# Patient Record
Sex: MALE | Race: WHITE | NOT HISPANIC OR LATINO | Employment: OTHER | URBAN - METROPOLITAN AREA
[De-identification: names, ages, dates, MRNs, and addresses within clinical notes are randomized per-mention and may not be internally consistent; named-entity substitution may affect disease eponyms.]

---

## 2023-08-14 ENCOUNTER — HOSPITAL ENCOUNTER (OUTPATIENT)
Dept: DATA CONVERSION | Facility: HOSPITAL | Age: 83
Discharge: HOME | End: 2023-08-14

## 2023-08-14 DIAGNOSIS — I10 ESSENTIAL (PRIMARY) HYPERTENSION: ICD-10-CM

## 2023-08-14 LAB
ANION GAP SERPL CALCULATED.3IONS-SCNC: 13 MMOL/L (ref 0–19)
BUN SERPL-MCNC: 29 MG/DL (ref 8–25)
BUN/CREAT SERPL: 17.1 RATIO (ref 8–21)
CALCIUM SERPL-MCNC: 9.8 MG/DL (ref 8.5–10.4)
CHLORIDE SERPL-SCNC: 105 MMOL/L (ref 97–107)
CO2 SERPL-SCNC: 24 MMOL/L (ref 24–31)
CREAT SERPL-MCNC: 1.7 MG/DL (ref 0.4–1.6)
GFR SERPL CREATININE-BSD FRML MDRD: 39 ML/MIN/1.73 M2
GLUCOSE SERPL-MCNC: 94 MG/DL (ref 65–99)
POTASSIUM SERPL-SCNC: 4.5 MMOL/L (ref 3.4–5.1)
SODIUM SERPL-SCNC: 142 MMOL/L (ref 133–145)

## 2023-08-26 PROBLEM — Z91.89 AT RISK FOR BLEEDING: Status: ACTIVE | Noted: 2023-08-26

## 2023-08-26 PROBLEM — R68.89 ACTIVITY INTOLERANCE: Status: ACTIVE | Noted: 2023-08-26

## 2023-08-26 PROBLEM — R53.83 FATIGUE: Status: ACTIVE | Noted: 2023-08-26

## 2023-08-26 PROBLEM — M16.11 ARTHRITIS OF RIGHT HIP: Status: ACTIVE | Noted: 2023-08-26

## 2023-08-26 PROBLEM — Z86.718 HISTORY OF VENOUS THROMBOEMBOLISM: Status: ACTIVE | Noted: 2023-08-26

## 2023-08-26 PROBLEM — N32.81 OVERACTIVE BLADDER: Status: ACTIVE | Noted: 2023-08-26

## 2023-08-26 PROBLEM — N20.0 KIDNEY STONE: Status: ACTIVE | Noted: 2023-08-26

## 2023-08-26 PROBLEM — I20.9 ANGINA PECTORIS (CMS-HCC): Status: ACTIVE | Noted: 2023-08-26

## 2023-08-26 PROBLEM — E78.2 MIXED HYPERLIPIDEMIA: Status: ACTIVE | Noted: 2023-08-26

## 2023-08-26 PROBLEM — I26.99 PULMONARY EMBOLISM (MULTI): Status: ACTIVE | Noted: 2023-08-26

## 2023-08-26 PROBLEM — R06.89 AIRWAY CLEARANCE IMPAIRMENT: Status: ACTIVE | Noted: 2023-08-26

## 2023-08-26 PROBLEM — I10 ESSENTIAL HYPERTENSION: Status: ACTIVE | Noted: 2023-08-26

## 2023-08-26 PROBLEM — H90.3 BILATERAL SENSORINEURAL HEARING LOSS: Status: ACTIVE | Noted: 2023-08-26

## 2023-08-26 PROBLEM — C34.90 LUNG CANCER (MULTI): Status: ACTIVE | Noted: 2023-08-26

## 2023-08-26 PROBLEM — E03.9 HYPOTHYROIDISM: Status: ACTIVE | Noted: 2023-08-26

## 2023-08-26 PROBLEM — R06.89 IMPAIRED GAS EXCHANGE: Status: ACTIVE | Noted: 2023-08-26

## 2023-08-26 PROBLEM — M10.9 GOUT: Status: ACTIVE | Noted: 2023-08-26

## 2023-08-26 PROBLEM — I25.118 ATHEROSCLEROTIC HEART DISEASE OF NATIVE CORONARY ARTERY WITH OTHER FORMS OF ANGINA PECTORIS (CMS-HCC): Status: ACTIVE | Noted: 2023-08-26

## 2023-08-26 PROBLEM — G62.9 NEUROPATHY: Status: ACTIVE | Noted: 2023-08-26

## 2023-08-26 PROBLEM — F32.A DEPRESSION: Status: ACTIVE | Noted: 2023-08-26

## 2023-08-26 PROBLEM — K21.9 GASTROESOPHAGEAL REFLUX DISEASE: Status: ACTIVE | Noted: 2023-08-26

## 2023-08-26 PROBLEM — K64.4 EXTERNAL HEMORRHOIDS: Status: ACTIVE | Noted: 2023-08-26

## 2023-08-26 RX ORDER — ALLOPURINOL 300 MG/1
300 TABLET ORAL DAILY
COMMUNITY
End: 2023-10-23 | Stop reason: ALTCHOICE

## 2023-08-26 RX ORDER — WARFARIN SODIUM 5 MG/1
5 TABLET ORAL
COMMUNITY
End: 2023-10-23 | Stop reason: ALTCHOICE

## 2023-08-26 RX ORDER — ALLOPURINOL 100 MG/1
100 TABLET ORAL DAILY
COMMUNITY
End: 2023-10-23 | Stop reason: ALTCHOICE

## 2023-08-26 RX ORDER — OXYBUTYNIN CHLORIDE 5 MG/1
5 TABLET ORAL 2 TIMES DAILY
COMMUNITY
Start: 2021-11-03 | End: 2023-10-16 | Stop reason: SDUPTHER

## 2023-08-26 RX ORDER — HYDROCODONE BITARTRATE AND ACETAMINOPHEN 5; 325 MG/1; MG/1
1 TABLET ORAL EVERY 4 HOURS PRN
COMMUNITY
End: 2023-10-23 | Stop reason: ALTCHOICE

## 2023-08-26 RX ORDER — ASPIRIN 325 MG
325 TABLET ORAL DAILY
COMMUNITY
End: 2023-10-27 | Stop reason: ALTCHOICE

## 2023-08-26 RX ORDER — TORSEMIDE 20 MG/1
20 TABLET ORAL DAILY
COMMUNITY
Start: 2023-07-31 | End: 2023-10-23 | Stop reason: ALTCHOICE

## 2023-08-26 RX ORDER — LOSARTAN POTASSIUM 50 MG/1
50 TABLET ORAL DAILY
COMMUNITY
Start: 2021-11-19 | End: 2023-10-23 | Stop reason: ALTCHOICE

## 2023-08-26 RX ORDER — MULTIVITAMIN
1 TABLET ORAL DAILY
COMMUNITY

## 2023-08-26 RX ORDER — ASPIRIN 81 MG/1
81 TABLET ORAL DAILY
COMMUNITY

## 2023-08-26 RX ORDER — TAMSULOSIN HYDROCHLORIDE 0.4 MG/1
0.4 CAPSULE ORAL DAILY
COMMUNITY
End: 2023-10-23 | Stop reason: ALTCHOICE

## 2023-08-26 RX ORDER — SOLIFENACIN SUCCINATE 5 MG/1
5 TABLET, FILM COATED ORAL DAILY
COMMUNITY
End: 2023-10-23 | Stop reason: ALTCHOICE

## 2023-08-26 RX ORDER — ROSUVASTATIN CALCIUM 20 MG/1
20 TABLET, COATED ORAL DAILY
COMMUNITY
Start: 2020-11-06 | End: 2023-11-01 | Stop reason: SDUPTHER

## 2023-08-26 RX ORDER — LEVOTHYROXINE SODIUM 50 UG/1
50 TABLET ORAL DAILY
COMMUNITY
Start: 2014-11-13

## 2023-08-26 RX ORDER — ATORVASTATIN CALCIUM 40 MG/1
40 TABLET, FILM COATED ORAL DAILY
COMMUNITY
End: 2023-10-23 | Stop reason: ALTCHOICE

## 2023-08-26 RX ORDER — OMEPRAZOLE 40 MG/1
40 CAPSULE, DELAYED RELEASE ORAL DAILY
COMMUNITY

## 2023-08-26 RX ORDER — ENOXAPARIN SODIUM 100 MG/ML
INJECTION SUBCUTANEOUS
COMMUNITY
End: 2023-10-23 | Stop reason: ALTCHOICE

## 2023-08-26 RX ORDER — ATENOLOL 25 MG/1
25 TABLET ORAL DAILY
COMMUNITY
End: 2023-10-23 | Stop reason: ALTCHOICE

## 2023-08-26 RX ORDER — VALSARTAN 80 MG/1
80 TABLET ORAL DAILY
COMMUNITY
Start: 2023-07-31

## 2023-08-26 RX ORDER — VENLAFAXINE HYDROCHLORIDE 37.5 MG/1
37.5 CAPSULE, EXTENDED RELEASE ORAL DAILY
COMMUNITY
Start: 2018-12-21

## 2023-08-26 RX ORDER — DOCUSATE SODIUM 100 MG/1
100 CAPSULE, LIQUID FILLED ORAL 2 TIMES DAILY
COMMUNITY
End: 2023-10-23 | Stop reason: ALTCHOICE

## 2023-08-26 RX ORDER — VENLAFAXINE HYDROCHLORIDE 75 MG/1
75 CAPSULE, EXTENDED RELEASE ORAL DAILY
COMMUNITY
End: 2023-10-27 | Stop reason: ALTCHOICE

## 2023-08-26 RX ORDER — ACETAMINOPHEN 325 MG/1
650 TABLET ORAL EVERY 6 HOURS PRN
COMMUNITY
End: 2023-10-27 | Stop reason: ALTCHOICE

## 2023-09-26 ENCOUNTER — HOSPITAL ENCOUNTER (OUTPATIENT)
Dept: DATA CONVERSION | Facility: HOSPITAL | Age: 83
Discharge: HOME | End: 2023-09-26
Payer: MEDICARE

## 2023-09-26 DIAGNOSIS — I50.9 HEART FAILURE, UNSPECIFIED (MULTI): ICD-10-CM

## 2023-10-13 ENCOUNTER — TELEPHONE (OUTPATIENT)
Dept: PRIMARY CARE | Facility: CLINIC | Age: 83
End: 2023-10-13
Payer: MEDICARE

## 2023-10-13 DIAGNOSIS — R68.89 ACTIVITY INTOLERANCE: Primary | ICD-10-CM

## 2023-10-16 DIAGNOSIS — N32.81 OVERACTIVE BLADDER: Primary | ICD-10-CM

## 2023-10-16 RX ORDER — OXYBUTYNIN CHLORIDE 5 MG/1
5 TABLET ORAL 2 TIMES DAILY
Qty: 180 TABLET | Refills: 1 | Status: SHIPPED | OUTPATIENT
Start: 2023-10-16 | End: 2023-10-23 | Stop reason: ALTCHOICE

## 2023-10-23 ENCOUNTER — OFFICE VISIT (OUTPATIENT)
Dept: OTOLARYNGOLOGY | Facility: CLINIC | Age: 83
End: 2023-10-23
Payer: MEDICARE

## 2023-10-23 VITALS — WEIGHT: 216 LBS | TEMPERATURE: 97.5 F | HEIGHT: 71 IN | BODY MASS INDEX: 30.24 KG/M2

## 2023-10-23 DIAGNOSIS — H61.20 CERUMEN IN AUDITORY CANAL ON EXAMINATION: Primary | ICD-10-CM

## 2023-10-23 DIAGNOSIS — K21.9 LARYNGOPHARYNGEAL REFLUX: ICD-10-CM

## 2023-10-23 PROCEDURE — 1036F TOBACCO NON-USER: CPT | Performed by: OTOLARYNGOLOGY

## 2023-10-23 PROCEDURE — 1159F MED LIST DOCD IN RCRD: CPT | Performed by: OTOLARYNGOLOGY

## 2023-10-23 PROCEDURE — 1160F RVW MEDS BY RX/DR IN RCRD: CPT | Performed by: OTOLARYNGOLOGY

## 2023-10-23 PROCEDURE — 99213 OFFICE O/P EST LOW 20 MIN: CPT | Performed by: OTOLARYNGOLOGY

## 2023-10-24 NOTE — PROGRESS NOTES
"HPI  Joseph Hicks is a 82 y.o. male here for routine right-sided cerumen management.  Wears hearing aids bilaterally and does well with them.  History of reflux and takes an acid with good effect.  Call us for refills when necessary.  He does not need refill now.  History of sensorineural hearing loss from treatment of lung cancer and chemotherapy around 11 years ago.      Past Medical History:   Diagnosis Date    Personal history of malignant neoplasm, unspecified     History of cancer            Medications:     Current Outpatient Medications:     acetaminophen (Tylenol) 325 mg tablet, Take 2 tablets (650 mg) by mouth every 6 hours if needed (pain)., Disp: , Rfl:     aspirin 325 mg tablet, Take 1 tablet (325 mg) by mouth once daily. Additional Instructions: ANTIPLATELET, Disp: , Rfl:     aspirin 81 mg EC tablet, Take 1 tablet (81 mg) by mouth once daily., Disp: , Rfl:     levothyroxine (Synthroid, Levoxyl) 50 mcg tablet, Take 1 tablet (50 mcg) by mouth once daily., Disp: , Rfl:     omeprazole (PriLOSEC) 40 mg DR capsule, Take 1 capsule (40 mg) by mouth once daily.  Additional Instructions: REFLUX, Disp: , Rfl:     rosuvastatin (Crestor) 20 mg tablet, Take 1 tablet (20 mg) by mouth once daily., Disp: , Rfl:     valsartan (Diovan) 80 mg tablet, Take 1 tablet (80 mg) by mouth once daily., Disp: , Rfl:     venlafaxine XR (Effexor-XR) 37.5 mg 24 hr capsule, Take 1 capsule (37.5 mg) by mouth once daily. With food, Disp: , Rfl:     venlafaxine XR (Effexor-XR) 75 mg 24 hr capsule, Take 1 capsule (75 mg) by mouth once daily. Additional Instructions: MOOD, Disp: , Rfl:     multivitamin with minerals (multivitamin with folic acid) tablet, Take 1 tablet by mouth once daily.  Additional Instructions: SUPPLEMENT, Disp: , Rfl:      Allergies:  No Known Allergies     Physical Exam:  Last Recorded Vitals  Temperature 36.4 °C (97.5 °F), height 1.803 m (5' 11\"), weight 98 kg (216 lb).  General:     General appearance: " Well-developed, well-nourished in no acute distress.       Voice:  normal       Head/face: Normal appearance; nontender to palpation     Facial nerve function: Normal and symmetric bilaterally.    Oral/oropharynx:     Oral vestibule: Normal labial and gingival mucosa     Tongue/floor of mouth: Normal without lesion     Oropharynx: Clear.  No lesions present of the hard/soft palate, posterior pharynx    Neck:     Neck: Normal appearance, trachea midline     Salivary glands: Normal to palpation bilaterally     Lymph nodes: No cervical lymphadenopathy to palpation     Thyroid: No thyromegaly.  No palpable nodules     Range of motion: Normal    Neurological:     Cortical functions: Alert and oriented x3, appropriate affect       Larynx/hypopharynx:     Laryngeal findings: Mirror exam inadequate or limited secondary to enlarged base of tongue and/or excessive gagging    Ear:     Ear canal: Normal bilaterally after cleaning dense cerumen impaction on the right and moderate cerumen on the left     Tympanic membrane: Intact and mobile bilaterally     Pinna: Normal bilaterally     Hearing:  Gross hearing assessment normal by voice    Nose:     Visualized using: Anterior rhinoscopy     Nasopharynx: Inadequate mirror exam secondary to gag, anatomy.       Nasal dorsum: Nontraumatic midline appearance     Septum: Midline     Inferior turbinates: Normally sized     Mucosa: Bilateral, pink, normal appearing       Assessment/Plan   Continue antacid.  Right ear cleaned.  We will see him back in 6 months and sooner as needed         Alexandre Joyner MD

## 2023-10-26 PROBLEM — R06.89 AIRWAY CLEARANCE IMPAIRMENT: Status: RESOLVED | Noted: 2023-08-26 | Resolved: 2023-10-26

## 2023-10-26 PROBLEM — Z85.118 HISTORY OF LUNG CANCER: Status: ACTIVE | Noted: 2023-10-26

## 2023-10-26 PROBLEM — R53.83 FATIGUE: Status: RESOLVED | Noted: 2023-08-26 | Resolved: 2023-10-26

## 2023-10-26 PROBLEM — C34.90 LUNG CANCER (MULTI): Status: RESOLVED | Noted: 2023-08-26 | Resolved: 2023-10-26

## 2023-10-26 PROBLEM — R06.89 IMPAIRED GAS EXCHANGE: Status: RESOLVED | Noted: 2023-08-26 | Resolved: 2023-10-26

## 2023-10-26 PROBLEM — N20.0 KIDNEY STONE: Status: RESOLVED | Noted: 2023-08-26 | Resolved: 2023-10-26

## 2023-10-26 PROBLEM — K64.4 EXTERNAL HEMORRHOIDS: Status: RESOLVED | Noted: 2023-08-26 | Resolved: 2023-10-26

## 2023-10-26 PROBLEM — I26.99 PULMONARY EMBOLISM (MULTI): Status: RESOLVED | Noted: 2023-08-26 | Resolved: 2023-10-26

## 2023-10-26 NOTE — PROGRESS NOTES
[facility]: [facility]  MEDICARE WELLNESS EXAM      Joseph Hicks is a 82 y.o. male that is presenting today for Annual Exam.    Assessment/Plan    Diagnoses and all orders for this visit:  Annual physical exam  Overactive bladder  Neuropathy  Kidney stone  Acquired hypothyroidism  -     TSH with reflex to Free T4 if abnormal; Future  Malignant neoplasm of lung, unspecified laterality, unspecified part of lung (CMS/HCC)  Mixed hyperlipidemia  -     Lipid Panel; Future  -     Follow Up In Primary Care - Established; Future  History of venous thromboembolism  Gastroesophageal reflux disease without esophagitis  Chronic gout without tophus, unspecified cause, unspecified site  -     CBC and Auto Differential; Future  -     Uric acid; Future  Essential hypertension  -     Comprehensive Metabolic Panel; Future  Depression, unspecified depression type  Atherosclerotic heart disease of native coronary artery with other forms of angina pectoris (CMS/[facility])  Bilateral sensorineural hearing loss  Arthritis of right hip  Chronic screening measures.  His immunizations seem to be up-to-date other than his need to get his RSV and COVID vaccines which she will do at a local pharmacy.  We reviewed his recent blood work which looks as well.  There is really no reason to change any of his medicines today and we will plan on seeing him back in about 6 months.  ADVANCED CARE PLANNING  Advanced Care Planning was discussed with patient:  The patient has an active advanced care plan on file. The patient has an active surrogate decision-maker on file.  The patient was encouraged to ensure that any/all documentation is accurate and up to date, and that our office be provided a copy in the event that anything changes.    ACTIVITIES OF DAILY LIVING  Basic ADLs:  Bathing: Independent, Dressing: Independent, Toileting: Independent, Transferring: Independent, Continence: Independent, Feeding: Independent.     Instrumental ADLs:  Ability to use phone: Independent, Shopping: Independent, Cooking: Independent, House-keeping: Independent, Laundry: Independent, Transportation: Independent, Medication Management: Independent, Finance Management: Independent.    Subjective   He is here for his Medicare wellness exam.  Overall he has been feeling very good.  The most recent problem was when he had that leg swelling and was on a higher dose of torsemide.  Fortunately the echocardiogram was normal and the cardiologist Dr. Severino has been able to gradually reduce the torsemide.  The patient is very pleased in that regard and brings forth no new problems today.    Review of Systems   Respiratory: Negative.     Cardiovascular: Negative.    Gastrointestinal: Negative.    Endocrine: Negative.    Genitourinary: Negative.    Musculoskeletal: Negative.    Neurological: Negative.      Objective   Vitals:    10/27/23 1036   BP: 108/60   Pulse: 62   Temp: 36.5 °C (97.7 °F)   SpO2: 95%      Body mass index is 30.13 kg/m².  Physical Exam  HENT:      Head: Normocephalic.   Eyes:      Pupils: Pupils are equal, round, and reactive to light.   Cardiovascular:      Rate and Rhythm: Normal rate and regular rhythm.   Pulmonary:      Effort: Pulmonary effort is normal.      Breath sounds: Normal breath sounds.   Abdominal:      General: Abdomen is flat. Bowel sounds are normal.      Palpations: Abdomen is soft.   Musculoskeletal:         General: Normal range of motion.      Left lower leg: No edema.   Skin:     General: Skin is warm and dry.   Neurological:      Mental Status: He is alert and oriented to person, place, and time. Mental status is at baseline.     Diagnostic Results   Lab Results   Component Value Date    GLUCOSE 94 08/14/2023    CALCIUM 9.8 08/14/2023     08/14/2023    K 4.5 08/14/2023    CO2 24 08/14/2023     08/14/2023    BUN 29 (H) 08/14/2023    CREATININE 1.7 (H) 08/14/2023     Lab Results   Component Value Date     "ALT 22 06/20/2023    AST 29 06/20/2023    ALKPHOS 113 06/20/2023    BILITOT 0.8 06/20/2023     Lab Results   Component Value Date    WBC 4.6 06/20/2023    HGB 15.8 06/20/2023    HCT 48.6 06/20/2023    .0 (H) 06/20/2023     06/20/2023     Lab Results   Component Value Date    CHOL 153 03/16/2023    CHOL 149 05/27/2022    CHOL 177 11/19/2021     Lab Results   Component Value Date    HDL 68 03/16/2023    HDL 68 05/27/2022    HDL 64 11/19/2021     Lab Results   Component Value Date    LDLCALC 65 03/16/2023    LDLCALC 59 (L) 05/27/2022    LDLCALC 80 11/19/2021     Lab Results   Component Value Date    TRIG 102 03/16/2023    TRIG 112 05/27/2022    TRIG 163 (H) 11/19/2021     No components found for: \"CHOLHDL\"  Lab Results   Component Value Date    HGBA1C 5.7 05/27/2022     Other labs not included in the list above reviewed either before or during this encounter.    History   Past Medical History:   Diagnosis Date   • Airway clearance impairment 08/26/2023   • Angina pectoris (CMS/HCC) 08/26/2023   • Arthritis of right hip 08/26/2023   • Atherosclerotic heart disease of native coronary artery with other forms of angina pectoris (CMS/HCC) 08/26/2023   • Bilateral sensorineural hearing loss 08/26/2023   • Depression 08/26/2023   • Essential hypertension 08/26/2023   • External hemorrhoids 08/26/2023   • Gastroesophageal reflux disease 08/26/2023   • Gout 08/26/2023   • History of venous thromboembolism 08/26/2023   • Hypothyroidism 08/26/2023   • Impaired gas exchange 08/26/2023   • Kidney stone 08/26/2023   • Lung cancer (CMS/HCC) 2012    nonsmall cell   • Mixed hyperlipidemia 08/26/2023   • Neuropathy 08/26/2023   • Overactive bladder 08/26/2023   • Personal history of malignant neoplasm, unspecified     History of cancer   • Pulmonary embolism (CMS/HCC) 2012     Past Surgical History:   Procedure Laterality Date   • CARDIAC CATHETERIZATION  2013   • COLONOSCOPY  2013   • CT GUIDED IMAGING FOR NEEDLE " PLACEMENT  09/26/2012    CT GUIDED IMAGING FOR NEEDLE PLACEMENT [facility]   • OTHER SURGICAL HISTORY  08/24/2022    Hip replacement   • TOTAL HIP ARTHROPLASTY Left      Family History   Problem Relation Name Age of Onset   • Hypothyroidism Daughter       Social History     Socioeconomic History   • Marital status:      Spouse name: Not on file   • Number of children: Not on file   • Years of education: Not on file   • Highest education level: Not on file   Occupational History   • Not on file   Tobacco Use   • Smoking status: Former     Types: Cigarettes   • Smokeless tobacco: Never   Substance and Sexual Activity   • Alcohol use: Not on file   • Drug use: Not on file   • Sexual activity: Not on file   Other Topics Concern   • Not on file   Social History Narrative   • Not on file     Social Determinants of Health     Financial Resource Strain: Not on file   Food Insecurity: Not on file   Transportation Needs: Not on file   Physical Activity: Not on file   Stress: Not on file   Social Connections: Not on file   Intimate Partner Violence: Not on file   Housing Stability: Not on file     No Known Allergies  Current Outpatient Medications on File Prior to Visit   Medication Sig Dispense Refill   • aspirin 81 mg EC tablet Take 1 tablet (81 mg) by mouth once daily.     • levothyroxine (Synthroid, Levoxyl) 50 mcg tablet Take 1 tablet (50 mcg) by mouth once daily.     • multivitamin with minerals (multivitamin with folic acid) tablet Take 1 tablet by mouth once daily.  Additional Instructions: SUPPLEMENT     • omeprazole (PriLOSEC) 40 mg DR capsule Take 1 capsule (40 mg) by mouth once daily.  Additional Instructions: REFLUX     • oxybutynin XL (Ditropan-XL) 5 mg 24 hr tablet Take 1 tablet (5 mg) by mouth once daily. Do not crush, chew, or split.     • rosuvastatin (Crestor) 20 mg tablet Take 1 tablet (20 mg) by mouth once daily.     • torsemide (Demadex) 5 mg tablet Take 1 tablet (5 mg) by mouth once  daily.     • valsartan (Diovan) 80 mg tablet Take 1 tablet (80 mg) by mouth once daily.     • venlafaxine XR (Effexor-XR) 37.5 mg 24 hr capsule Take 1 capsule (37.5 mg) by mouth once daily. With food     • [DISCONTINUED] acetaminophen (Tylenol) 325 mg tablet Take 2 tablets (650 mg) by mouth every 6 hours if needed (pain).     • [DISCONTINUED] allopurinol (Zyloprim) 100 mg tablet Take 1 tablet (100 mg) by mouth once daily.     • [DISCONTINUED] allopurinol (Zyloprim) 300 mg tablet Take 1 tablet (300 mg) by mouth once daily. Additional Instructions: KIDNEY STONES     • [DISCONTINUED] aspirin 325 mg tablet Take 1 tablet (325 mg) by mouth once daily. Additional Instructions: ANTIPLATELET     • [DISCONTINUED] atenolol (Tenormin) 25 mg tablet Take 1 tablet (25 mg) by mouth once daily.     • [DISCONTINUED] atorvastatin (Lipitor) 40 mg tablet Take 1 tablet (40 mg) by mouth once daily.     • [DISCONTINUED] docusate sodium (Colace) 100 mg capsule Take 1 capsule (100 mg) by mouth 2 times a day.     • [DISCONTINUED] DULCOLAX, BISACODYL, RECT once daily as needed (constipation).     • [DISCONTINUED] enoxaparin (Lovenox) 30 mg/0.3 mL syringe DIRECTIONS: 30 MG SUBCUTANEOUS TWICE A DAY.  Additional Instructions: 12 doses.     • [DISCONTINUED] HYDROcodone-acetaminophen (Norco) 5-325 mg tablet Take 1 tablet by mouth every 4 hours if needed (Pain).     • [DISCONTINUED] losartan (Cozaar) 50 mg tablet Take 1 tablet (50 mg) by mouth once daily.     • [DISCONTINUED] oxybutynin (Ditropan) 5 mg tablet Take 1 tablet (5 mg) by mouth 2 times a day. 180 tablet 1   • [DISCONTINUED] polyethylene glycol 3350 (MIRALAX ORAL) ONE CAPFUL DISSOLVED IN A GLASS OF WATER ONCE DAILY     • [DISCONTINUED] solifenacin (Vesicare) 5 mg tablet Take 1 tablet (5 mg) by mouth once daily. Additional Instructions: BLADDER     • [DISCONTINUED] tamsulosin (Flomax) 0.4 mg 24 hr capsule Take 1 capsule (0.4 mg) by mouth once daily.     • [DISCONTINUED] torsemide (Demadex)  20 mg tablet Take 1 tablet (20 mg) by mouth once daily.     • [DISCONTINUED] venlafaxine XR (Effexor-XR) 75 mg 24 hr capsule Take 1 capsule (75 mg) by mouth once daily. Additional Instructions: MOOD     • [DISCONTINUED] warfarin (Coumadin) 5 mg tablet Take 1 tablet (5 mg) by mouth. Daily       No current facility-administered medications on file prior to visit.     Immunization History   Administered Date(s) Administered   • Flu vaccine, quadrivalent, high-dose, preservative free, age 65y+ (FLUZONE) 09/13/2020, 10/01/2020, 09/25/2021, 10/10/2022   • Influenza, High Dose Seasonal, Preservative Free 10/02/2017, 10/02/2018, 10/04/2019   • Influenza, injectable, quadrivalent 09/23/2016   • Influenza, seasonal, injectable 11/02/2013, 10/01/2014, 10/06/2015   • Moderna COVID-19 vaccine, bivalent, blue cap/gray label *Check age/dose* 12/06/2022, 06/16/2023   • Moderna SARS-CoV-2 Vaccination 01/15/2021, 02/01/2021, 03/02/2021, 11/04/2021, 08/16/2022   • Pneumococcal conjugate vaccine, 13-valent (PREVNAR 13) 05/08/2015   • Pneumococcal polysaccharide vaccine, 23-valent, age 2 years and older (PNEUMOVAX 23) 05/02/2014, 11/06/2020   • Tdap vaccine, age 7 year and older (BOOSTRIX) 10/04/2017   • Zoster vaccine, recombinant, adult (SHINGRIX) 01/05/2018, 04/08/2018, 07/20/2018, 05/01/2019   • Zoster, live 01/02/2012     Patient's medical history was reviewed and updated either before or during this encounter.    Bogdan Busby MD

## 2023-10-27 ENCOUNTER — LAB (OUTPATIENT)
Dept: LAB | Facility: LAB | Age: 83
End: 2023-10-27
Payer: MEDICARE

## 2023-10-27 ENCOUNTER — OFFICE VISIT (OUTPATIENT)
Dept: PRIMARY CARE | Facility: CLINIC | Age: 83
End: 2023-10-27
Payer: MEDICARE

## 2023-10-27 VITALS
WEIGHT: 216 LBS | HEART RATE: 62 BPM | OXYGEN SATURATION: 95 % | DIASTOLIC BLOOD PRESSURE: 60 MMHG | BODY MASS INDEX: 30.13 KG/M2 | TEMPERATURE: 97.7 F | SYSTOLIC BLOOD PRESSURE: 108 MMHG

## 2023-10-27 DIAGNOSIS — M1A.9XX0 CHRONIC GOUT WITHOUT TOPHUS, UNSPECIFIED CAUSE, UNSPECIFIED SITE: ICD-10-CM

## 2023-10-27 DIAGNOSIS — C34.90 MALIGNANT NEOPLASM OF LUNG, UNSPECIFIED LATERALITY, UNSPECIFIED PART OF LUNG (MULTI): ICD-10-CM

## 2023-10-27 DIAGNOSIS — E03.9 ACQUIRED HYPOTHYROIDISM: ICD-10-CM

## 2023-10-27 DIAGNOSIS — M16.11 ARTHRITIS OF RIGHT HIP: ICD-10-CM

## 2023-10-27 DIAGNOSIS — G62.9 NEUROPATHY: ICD-10-CM

## 2023-10-27 DIAGNOSIS — Z86.718 HISTORY OF VENOUS THROMBOEMBOLISM: ICD-10-CM

## 2023-10-27 DIAGNOSIS — F32.A DEPRESSION, UNSPECIFIED DEPRESSION TYPE: ICD-10-CM

## 2023-10-27 DIAGNOSIS — I25.118 ATHEROSCLEROTIC HEART DISEASE OF NATIVE CORONARY ARTERY WITH OTHER FORMS OF ANGINA PECTORIS (CMS-HCC): ICD-10-CM

## 2023-10-27 DIAGNOSIS — I10 ESSENTIAL HYPERTENSION: ICD-10-CM

## 2023-10-27 DIAGNOSIS — Z00.00 ANNUAL PHYSICAL EXAM: Primary | ICD-10-CM

## 2023-10-27 DIAGNOSIS — N32.81 OVERACTIVE BLADDER: ICD-10-CM

## 2023-10-27 DIAGNOSIS — E78.2 MIXED HYPERLIPIDEMIA: ICD-10-CM

## 2023-10-27 DIAGNOSIS — N20.0 KIDNEY STONE: ICD-10-CM

## 2023-10-27 DIAGNOSIS — H90.3 BILATERAL SENSORINEURAL HEARING LOSS: ICD-10-CM

## 2023-10-27 DIAGNOSIS — E78.5 HYPERLIPIDEMIA, UNSPECIFIED: ICD-10-CM

## 2023-10-27 DIAGNOSIS — E03.9 HYPOTHYROIDISM, UNSPECIFIED: ICD-10-CM

## 2023-10-27 DIAGNOSIS — M10.9 GOUT, UNSPECIFIED: Primary | ICD-10-CM

## 2023-10-27 DIAGNOSIS — K21.9 GASTROESOPHAGEAL REFLUX DISEASE WITHOUT ESOPHAGITIS: ICD-10-CM

## 2023-10-27 LAB
ALBUMIN SERPL-MCNC: 4.3 G/DL (ref 3.5–5)
ALP BLD-CCNC: 92 U/L (ref 35–125)
ALT SERPL-CCNC: 20 U/L (ref 5–40)
ANION GAP SERPL CALC-SCNC: 11 MMOL/L
AST SERPL-CCNC: 24 U/L (ref 5–40)
BILIRUB SERPL-MCNC: 0.9 MG/DL (ref 0.1–1.2)
BUN SERPL-MCNC: 25 MG/DL (ref 8–25)
CALCIUM SERPL-MCNC: 9.7 MG/DL (ref 8.5–10.4)
CHLORIDE SERPL-SCNC: 105 MMOL/L (ref 97–107)
CHOLEST SERPL-MCNC: 152 MG/DL (ref 133–200)
CHOLEST/HDLC SERPL: 2.2 {RATIO}
CO2 SERPL-SCNC: 26 MMOL/L (ref 24–31)
CREAT SERPL-MCNC: 1.3 MG/DL (ref 0.4–1.6)
ERYTHROCYTE [DISTWIDTH] IN BLOOD BY AUTOMATED COUNT: 14.4 % (ref 11.5–14.5)
GFR SERPL CREATININE-BSD FRML MDRD: 54 ML/MIN/1.73M*2
GLUCOSE SERPL-MCNC: 94 MG/DL (ref 65–99)
HCT VFR BLD AUTO: 49.2 % (ref 41–52)
HDLC SERPL-MCNC: 70 MG/DL
HGB BLD-MCNC: 16 G/DL (ref 13.5–17.5)
LDLC SERPL CALC-MCNC: 62 MG/DL (ref 65–130)
MCH RBC QN AUTO: 32.6 PG (ref 26–34)
MCHC RBC AUTO-ENTMCNC: 32.5 G/DL (ref 32–36)
MCV RBC AUTO: 100 FL (ref 80–100)
NRBC BLD-RTO: 0 /100 WBCS (ref 0–0)
PLATELET # BLD AUTO: 202 X10*3/UL (ref 150–450)
PMV BLD AUTO: 9.8 FL (ref 7.5–11.5)
POTASSIUM SERPL-SCNC: 4.5 MMOL/L (ref 3.4–5.1)
PROT SERPL-MCNC: 6.9 G/DL (ref 5.9–7.9)
RBC # BLD AUTO: 4.91 X10*6/UL (ref 4.5–5.9)
SODIUM SERPL-SCNC: 142 MMOL/L (ref 133–145)
TRIGL SERPL-MCNC: 99 MG/DL (ref 40–150)
TSH SERPL DL<=0.05 MIU/L-ACNC: 2 MIU/L (ref 0.27–4.2)
URATE SERPL-MCNC: 7.1 MG/DL (ref 3.6–7.7)
WBC # BLD AUTO: 5.4 X10*3/UL (ref 4.4–11.3)

## 2023-10-27 PROCEDURE — 99214 OFFICE O/P EST MOD 30 MIN: CPT | Performed by: INTERNAL MEDICINE

## 2023-10-27 PROCEDURE — 36415 COLL VENOUS BLD VENIPUNCTURE: CPT

## 2023-10-27 PROCEDURE — 3074F SYST BP LT 130 MM HG: CPT | Performed by: INTERNAL MEDICINE

## 2023-10-27 PROCEDURE — 1159F MED LIST DOCD IN RCRD: CPT | Performed by: INTERNAL MEDICINE

## 2023-10-27 PROCEDURE — 84550 ASSAY OF BLOOD/URIC ACID: CPT

## 2023-10-27 PROCEDURE — 80061 LIPID PANEL: CPT

## 2023-10-27 PROCEDURE — 80053 COMPREHEN METABOLIC PANEL: CPT

## 2023-10-27 PROCEDURE — 85027 COMPLETE CBC AUTOMATED: CPT

## 2023-10-27 PROCEDURE — 1160F RVW MEDS BY RX/DR IN RCRD: CPT | Performed by: INTERNAL MEDICINE

## 2023-10-27 PROCEDURE — 1126F AMNT PAIN NOTED NONE PRSNT: CPT | Performed by: INTERNAL MEDICINE

## 2023-10-27 PROCEDURE — 1036F TOBACCO NON-USER: CPT | Performed by: INTERNAL MEDICINE

## 2023-10-27 PROCEDURE — 84443 ASSAY THYROID STIM HORMONE: CPT

## 2023-10-27 PROCEDURE — 3078F DIAST BP <80 MM HG: CPT | Performed by: INTERNAL MEDICINE

## 2023-10-27 RX ORDER — OXYBUTYNIN CHLORIDE 5 MG/1
5 TABLET, EXTENDED RELEASE ORAL DAILY
COMMUNITY
End: 2024-05-06 | Stop reason: WASHOUT

## 2023-10-27 RX ORDER — TORSEMIDE 5 MG/1
5 TABLET ORAL DAILY
COMMUNITY
End: 2024-05-24 | Stop reason: SINTOL

## 2023-10-27 ASSESSMENT — ENCOUNTER SYMPTOMS
CARDIOVASCULAR NEGATIVE: 1
ENDOCRINE NEGATIVE: 1
DEPRESSION: 0
RESPIRATORY NEGATIVE: 1
OCCASIONAL FEELINGS OF UNSTEADINESS: 0
MUSCULOSKELETAL NEGATIVE: 1
NEUROLOGICAL NEGATIVE: 1
LOSS OF SENSATION IN FEET: 0
GASTROINTESTINAL NEGATIVE: 1

## 2023-10-27 ASSESSMENT — PATIENT HEALTH QUESTIONNAIRE - PHQ9
SUM OF ALL RESPONSES TO PHQ9 QUESTIONS 1 AND 2: 0
2. FEELING DOWN, DEPRESSED OR HOPELESS: NOT AT ALL
1. LITTLE INTEREST OR PLEASURE IN DOING THINGS: NOT AT ALL

## 2023-10-27 ASSESSMENT — PAIN SCALES - GENERAL: PAINLEVEL: 0-NO PAIN

## 2023-10-27 NOTE — PATIENT INSTRUCTIONS
I am glad we had a chance to do your Medicare wellness exam today.  As you know we reviewed your screening measures and you are pretty much up-to-date.  You will be due for pneumonia shot until 2025.  You already had your shingles and tetanus shots and those are up-to-date but you could benefit from an RSV and COVID shot which you can do at a local pharmacy.  I would continue all your current medications as is and I ordered a blood test to do in 6 months.  Please complete the blood test you are supposed to do yesterday today and I will get back to you about that.  Please let me know if I can help you any time during the winter.

## 2023-11-01 ENCOUNTER — TELEPHONE (OUTPATIENT)
Dept: PRIMARY CARE | Facility: CLINIC | Age: 83
End: 2023-11-01
Payer: MEDICARE

## 2023-11-01 DIAGNOSIS — E78.2 MIXED HYPERLIPIDEMIA: ICD-10-CM

## 2023-11-01 RX ORDER — ROSUVASTATIN CALCIUM 20 MG/1
20 TABLET, COATED ORAL DAILY
Qty: 90 TABLET | Refills: 3 | Status: SHIPPED | OUTPATIENT
Start: 2023-11-01 | End: 2024-05-09 | Stop reason: ALTCHOICE

## 2023-11-01 NOTE — TELEPHONE ENCOUNTER
Refill request to Di:     Rosuvastatin Calcium 20 MG Tablet    Disp: 90  Tablet, Duration:  90 day(s)    Si tablet Orally Once a day 90 days  Refills: 3

## 2023-11-19 PROBLEM — F32.A CHRONIC DEPRESSION: Status: ACTIVE | Noted: 2019-03-18

## 2023-11-19 PROBLEM — H33.20 RETINAL DETACHMENT: Status: ACTIVE | Noted: 2019-03-18

## 2023-11-19 PROBLEM — I25.10 CORONARY ARTERIOSCLEROSIS: Status: ACTIVE | Noted: 2019-03-18

## 2023-11-19 PROBLEM — C34.90 MALIGNANT NEOPLASM OF LUNG (MULTI): Status: ACTIVE | Noted: 2019-03-18

## 2023-11-19 PROBLEM — I50.9 CONGESTIVE HEART FAILURE (MULTI): Status: ACTIVE | Noted: 2023-11-19

## 2023-11-19 PROBLEM — H91.90 HEARING LOSS: Status: ACTIVE | Noted: 2019-03-18

## 2023-11-19 PROBLEM — N18.31 STAGE 3A CHRONIC KIDNEY DISEASE (CKD) (MULTI): Status: ACTIVE | Noted: 2023-11-19

## 2023-11-19 PROBLEM — N40.0 BPH WITHOUT URINARY OBSTRUCTION: Status: ACTIVE | Noted: 2019-03-18

## 2023-11-19 PROBLEM — I25.118 ATHEROSCLEROTIC HEART DISEASE OF NATIVE CORONARY ARTERY WITH OTHER FORMS OF ANGINA PECTORIS (CMS-HCC): Status: ACTIVE | Noted: 2019-03-18

## 2023-11-20 ENCOUNTER — TELEPHONE (OUTPATIENT)
Dept: PRIMARY CARE | Facility: CLINIC | Age: 83
End: 2023-11-20
Payer: MEDICARE

## 2023-11-20 DIAGNOSIS — N20.0 KIDNEY STONE: Primary | ICD-10-CM

## 2023-11-20 RX ORDER — ALLOPURINOL 100 MG/1
100 TABLET ORAL DAILY
Qty: 90 TABLET | Refills: 3 | Status: SHIPPED | OUTPATIENT
Start: 2023-11-20 | End: 2024-11-19

## 2023-11-22 ENCOUNTER — OFFICE VISIT (OUTPATIENT)
Dept: CARDIOLOGY | Facility: CLINIC | Age: 83
End: 2023-11-22
Payer: MEDICARE

## 2023-11-22 VITALS
HEIGHT: 71 IN | TEMPERATURE: 98.9 F | DIASTOLIC BLOOD PRESSURE: 80 MMHG | HEART RATE: 76 BPM | RESPIRATION RATE: 18 BRPM | BODY MASS INDEX: 30.24 KG/M2 | WEIGHT: 216 LBS | OXYGEN SATURATION: 97 % | SYSTOLIC BLOOD PRESSURE: 120 MMHG

## 2023-11-22 DIAGNOSIS — I25.118 ATHEROSCLEROTIC HEART DISEASE OF NATIVE CORONARY ARTERY WITH OTHER FORMS OF ANGINA PECTORIS (CMS-HCC): ICD-10-CM

## 2023-11-22 DIAGNOSIS — E78.2 MIXED HYPERLIPIDEMIA: Primary | ICD-10-CM

## 2023-11-22 DIAGNOSIS — I50.32 CHRONIC DIASTOLIC CONGESTIVE HEART FAILURE (MULTI): ICD-10-CM

## 2023-11-22 DIAGNOSIS — I10 ESSENTIAL HYPERTENSION: ICD-10-CM

## 2023-11-22 PROCEDURE — 1159F MED LIST DOCD IN RCRD: CPT | Performed by: INTERNAL MEDICINE

## 2023-11-22 PROCEDURE — 1160F RVW MEDS BY RX/DR IN RCRD: CPT | Performed by: INTERNAL MEDICINE

## 2023-11-22 PROCEDURE — 1126F AMNT PAIN NOTED NONE PRSNT: CPT | Performed by: INTERNAL MEDICINE

## 2023-11-22 PROCEDURE — 1036F TOBACCO NON-USER: CPT | Performed by: INTERNAL MEDICINE

## 2023-11-22 PROCEDURE — 99213 OFFICE O/P EST LOW 20 MIN: CPT | Performed by: INTERNAL MEDICINE

## 2023-11-22 PROCEDURE — 3079F DIAST BP 80-89 MM HG: CPT | Performed by: INTERNAL MEDICINE

## 2023-11-22 PROCEDURE — 93000 ELECTROCARDIOGRAM COMPLETE: CPT | Performed by: INTERNAL MEDICINE

## 2023-11-22 PROCEDURE — 3074F SYST BP LT 130 MM HG: CPT | Performed by: INTERNAL MEDICINE

## 2023-11-22 ASSESSMENT — ENCOUNTER SYMPTOMS
OCCASIONAL FEELINGS OF UNSTEADINESS: 1
DEPRESSION: 0
LOSS OF SENSATION IN FEET: 1

## 2023-11-22 ASSESSMENT — PAIN SCALES - GENERAL: PAINLEVEL: 0-NO PAIN

## 2023-11-22 NOTE — PROGRESS NOTES
Subjective   Chief Complaint   Patient presents with    Follow-up     Mr. Hicks is present for his 3 month Follow up with Dr. Severino      82-year-old patient with a history of hypertension hyperlipidemia history of PCI of LCx in 2003, history of acute on chronic diastolic CHF in the past.  Currently on valsartan with levothyroxine aspirin and Crestor.  Also currently on a low-dose of diuretics.  No active angina or CHF signs symptoms.  Echocardiogram was done essentially showed an normal ejection fraction.  Echo was done in September 26 found to having normal ejection fraction with trivial tricuspid dilatation otherwise unremarkable.  For activity level.    Past Medical History:   Diagnosis Date    Airway clearance impairment 08/26/2023    Angina pectoris (CMS/[facility]) 08/26/2023    Arthritis of right hip 08/26/2023    At risk for bleeding 08/26/2023    Atherosclerotic heart disease of native coronary artery with other forms of angina pectoris (CMS/[facility]) 03/18/2019    Bilateral sensorineural hearing loss 08/26/2023    Congestive heart failure (CMS/[facility]) 11/19/2023    Coronary arteriosclerosis 03/18/2019    Depression 08/26/2023    Essential hypertension 08/26/2023    External hemorrhoids 08/26/2023    Gastroesophageal reflux disease 08/26/2023    Gout 08/26/2023    History of lung cancer 10/26/2023    History of venous thromboembolism 08/26/2023    Hypothyroidism 08/26/2023    Impaired gas exchange 08/26/2023    Kidney stone 08/26/2023    Lung cancer (CMS/[facility]) 2012    nonsmall cell    Malignant neoplasm of lung (CMS/[facility]) 03/18/2019    chemo and radiation, remission for 6 years    Mixed hyperlipidemia 08/26/2023    Neuropathy 08/26/2023    Overactive bladder 08/26/2023    Personal history of malignant neoplasm, unspecified     History of cancer    Pulmonary embolism (CMS/[facility]) 2012    Squamous cell carcinoma lung, 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  Current Outpatient Medications   Medication Sig Dispense Refill    allopurinol (Zyloprim) 100 mg tablet Take 1 tablet (100 mg) by mouth once daily. 90 tablet 3    aspirin 81 mg EC tablet Take 1 tablet (81 mg) by mouth once daily.      levothyroxine (Synthroid, Levoxyl) 50 mcg tablet Take 1 tablet (50 mcg) by mouth once daily.      multivitamin with minerals (multivitamin with folic acid) tablet Take 1 tablet by mouth once daily.  Additional Instructions: SUPPLEMENT      omeprazole (PriLOSEC) 40 mg DR capsule Take 1 capsule (40 mg) by mouth once daily.  Additional Instructions: REFLUX      oxybutynin XL (Ditropan-XL) 5 mg 24 hr tablet Take 1 tablet (5 mg) by mouth once daily. Do not crush, chew, or split.      rosuvastatin (Crestor) 20 mg tablet Take 1 tablet (20 mg) by mouth once daily. 90 tablet 3    torsemide (Demadex) 5 mg tablet Take 1 tablet (5 mg) by mouth once daily.      valsartan (Diovan) 80 mg tablet Take 1 tablet (80 mg) by mouth once daily.      venlafaxine XR (Effexor-XR) 37.5 mg 24 hr capsule Take 1 capsule (37.5 mg) by mouth once daily. With food       No current facility-administered medications for this visit.      reports that he has quit smoking. His smoking use included cigarettes. He has never been exposed to tobacco smoke. He has never used smokeless tobacco. He reports current alcohol use of about 6.0 standard drinks of alcohol per week. He reports that he does not use drugs.  Patient has no known allergies.  Mixed hyperlipidemia    Vitals:    11/22/23 0945   BP: 120/80   Pulse: 76   Resp: 18   Temp: 37.2 °C (98.9 °F)   TempSrc: Core  "  SpO2: 97%   Weight: 98 kg (216 lb)   Height: 1.803 m (5' 11\")   PainSc: 0-No pain      BMI:Body mass index is 30.13 kg/m².   General Cardiology:  General Appearance: Alert, oriented and in no acute distress.  HEENT: extra ocular movements intact (EOMI), pupils equal,  round, reactive to light and accommodation (PERRLA).  Carotid Upstroke: no bruit, normal.  Jugular Venous Distention (JVD): flat.  Chest: normal.  Lungs: Clear to auscultation,   Heart Sounds: no S3 or S4, normal S1, S2, regular rate.  Murmur, Click, Gallop: no systolic murmur.  Abdomen: no hepatomegaly, no masses felt, soft.  Extremities: no leg edema.  Peripheral pulses: 2 plus bilateral.  NEUROLOGY Cranial nerves II-XII grossly intact.     Patient Active Problem List   Diagnosis    At risk for bleeding    Overactive bladder    Neuropathy    Malignant neoplasm of lung (CMS/HCC)    Hypothyroidism    Mixed hyperlipidemia    History of venous thromboembolism    Gout    Gastroesophageal reflux disease    Essential hypertension    Chronic depression    Atherosclerotic heart disease of native coronary artery with other forms of angina pectoris (CMS/HCC)    Bilateral sensorineural hearing loss    Arthritis of right hip    Angina pectoris (CMS/HCC)    Activity intolerance    History of lung cancer    BPH without urinary obstruction    Congestive heart failure (CMS/HCC)    Hearing loss    Retinal detachment    Squamous cell carcinoma lung, right (CMS/HCC)    Stage 3a chronic kidney disease (CKD) (CMS/HCC)       Problem List Items Addressed This Visit          Cardiac and Vasculature    Mixed hyperlipidemia - Primary    Relevant Orders    ECG 12 Lead    Essential hypertension    Atherosclerotic heart disease of native coronary artery with other forms of angina pectoris (CMS/HCC)    Congestive heart failure (CMS/HCC)      82-year-old patient with a history of chronic diastolic CHF, CAD, PCI for LCx in the past  Patient currently on Diovan, torsemide, " Crestor  EKG shows sinus rhythm 74 prolonged NJ interval with a right bundle EKG with a nonspecific ST-T changes seen.  Stable cardiac wise.  Advised patient to avoid lunch meats, canned soups, pizzas, bread rolls, and sandwiches. Advised patient to limit salt intake 1,500 mg daily. Advised patient to exercise 30 mins/3 times a week including treadmill or aerobic type, Goal to achieve 65% target HR.    Erick Severino MD

## 2023-12-08 ENCOUNTER — DOCUMENTATION (OUTPATIENT)
Dept: AUDIOLOGY | Facility: CLINIC | Age: 83
End: 2023-12-08
Payer: MEDICARE

## 2023-12-08 NOTE — PROGRESS NOTES
RIGHT: PHONLENA MORGANEO M90R RECHARGEABLE TRANG WITH #2 P  AND LARGE POWER DOME  S.N.: [id number]  LEFT: PHONAK AUDEO M90R RECHARGEABLE TRANG WITH #2 P  AND LARGE POWER DOME  S.N.: [id number]  WARRANTY EXPIRATION: 3/6/2022     KANE SELECT IN  S.N. [id number]  WARRANTY EXPIRATION; 5/1/2023    Casey brought in his Kane Select iN because it stopped charging and whenever it was connected to his  he heard consistent beeping in his hearing aids. Patient also reports that Kane dies if it's not plugged in and it produces no sound.  After Kane device was brought into the [place] office, [facility] assisted in trouble shooting.  A. Hearing aid was plugged in directly to Kane device (no docking station was used) and original Kane cord and plug were both used.   B. Kane device is not paired to any audio source. The only device Kane is paired to is patient's hearing aids.  C.  Patient feels that he may be having trouble because he lost the original cord that came with the Kane device and he tried an incompatible USB-C cord and the device has not worked since.    [facility] is sending a new cord to test the device with; the also suggested doing a reset on the Kane device, and having the patient bring in his hearing aids and cord for an appointment. Patient was scheduled for a follow up hearing aid check.

## 2023-12-21 ENCOUNTER — CLINICAL SUPPORT (OUTPATIENT)
Dept: AUDIOLOGY | Facility: CLINIC | Age: 83
End: 2023-12-21
Payer: MEDICARE

## 2023-12-21 DIAGNOSIS — H90.3 SENSORINEURAL HEARING LOSS (SNHL) OF BOTH EARS: Primary | ICD-10-CM

## 2023-12-22 NOTE — PROGRESS NOTES
HEARING AID CHECK    RIGHT: [employer] M90R RECHARGEABLE TRANG WITH #2 P  AND LARGE POWER DOME, NO RETENTION TAIL  S.N.: [id number]  LEFT: [employer] M90R RECHARGEABLE TRANG WITH #2 P  AND LARGE POWER DOME, NO RETENTION TAIL  S.N.: [id number]  WARRANTY EXPIRATION: 3/6/2022    [employer] IN  S.N. [id number]  WARRANTY EXPIRATION; 5/1/2023    The patient brought his [employer] IN in to be checked because it was not maintaining a charge.  The patient had used a power cord that did not come with the [employer].  Gave him a new power cord.  The hearing aids and [employer] IN seemed to be functioning well together.  N/C    APPOINTMENT TIME: 4:00-4:30

## 2024-01-31 ENCOUNTER — TELEPHONE (OUTPATIENT)
Dept: PRIMARY CARE | Facility: CLINIC | Age: 84
End: 2024-01-31
Payer: MEDICARE

## 2024-01-31 NOTE — TELEPHONE ENCOUNTER
Patient would like to know if dr is willing to increase oxybutynin dose? States that he was prescribed torsemide  by cardiologist and is constantly going to the bathroom. Please advise.

## 2024-02-14 ENCOUNTER — TELEPHONE (OUTPATIENT)
Dept: PRIMARY CARE | Facility: CLINIC | Age: 84
End: 2024-02-14
Payer: MEDICARE

## 2024-02-14 NOTE — TELEPHONE ENCOUNTER
Pt has been taking 2 oxybutynin since 1/31/24 as advised but states no change to overactive bladder.  Asking for next steps.  Please advise.  Ph: [phone]   Quality 111:Pneumonia Vaccination Status For Older Adults: Pneumococcal Vaccination not Administered or Previously Received, Reason not Otherwise Specified Quality 431: Preventive Care And Screening: Unhealthy Alcohol Use - Screening: Patient screened for unhealthy alcohol use using a single question and scores less than 2 times per year Detail Level: Detailed Quality 226: Preventive Care And Screening: Tobacco Use: Screening And Cessation Intervention: Patient screened for tobacco use and is an ex/non-smoker Quality 130: Documentation Of Current Medications In The Medical Record: Current Medications Documented Quality 110: Preventive Care And Screening: Influenza Immunization: Influenza Immunization Ordered or Recommended, but not Administered due to system reason

## 2024-02-14 NOTE — TELEPHONE ENCOUNTER
Pt states he has seen Dr. Archer in the past; will call to schedule appt and let us know if they require referral.

## 2024-03-06 ENCOUNTER — OFFICE VISIT (OUTPATIENT)
Dept: OTOLARYNGOLOGY | Facility: CLINIC | Age: 84
End: 2024-03-06
Payer: MEDICARE

## 2024-03-06 VITALS — BODY MASS INDEX: 31.5 KG/M2 | WEIGHT: 220 LBS | HEIGHT: 70 IN

## 2024-03-06 DIAGNOSIS — H61.20 CERUMEN IN AUDITORY CANAL ON EXAMINATION: Primary | ICD-10-CM

## 2024-03-06 DIAGNOSIS — K21.9 LARYNGOPHARYNGEAL REFLUX: ICD-10-CM

## 2024-03-06 PROCEDURE — 99213 OFFICE O/P EST LOW 20 MIN: CPT | Performed by: OTOLARYNGOLOGY

## 2024-03-06 PROCEDURE — 1126F AMNT PAIN NOTED NONE PRSNT: CPT | Performed by: OTOLARYNGOLOGY

## 2024-03-06 PROCEDURE — 1036F TOBACCO NON-USER: CPT | Performed by: OTOLARYNGOLOGY

## 2024-03-06 PROCEDURE — 1159F MED LIST DOCD IN RCRD: CPT | Performed by: OTOLARYNGOLOGY

## 2024-03-06 NOTE — PROGRESS NOTES
HPI  Joseph Hicks is a 83 y.o. male here for routine right-sided cerumen management.  Wears hearing aids bilaterally and does well with them.  History of reflux and takes antiacid with good effect.  Call us for refills when necessary.  History of sensorineural hearing loss from treatment of lung cancer and chemotherapy around 11 years ago.      Past Medical History:   Diagnosis Date    Airway clearance impairment 08/26/2023    Angina pectoris (CMS/[facility]) 08/26/2023    Arthritis of right hip 08/26/2023    At risk for bleeding 08/26/2023    Atherosclerotic heart disease of native coronary artery with other forms of angina pectoris (CMS/[facility]) 03/18/2019    Bilateral sensorineural hearing loss 08/26/2023    Congestive heart failure (CMS/[facility]) 11/19/2023    Coronary arteriosclerosis 03/18/2019    Depression 08/26/2023    Essential hypertension 08/26/2023    External hemorrhoids 08/26/2023    Gastroesophageal reflux disease 08/26/2023    Gout 08/26/2023    History of lung cancer 10/26/2023    History of venous thromboembolism 08/26/2023    Hypothyroidism 08/26/2023    Impaired gas exchange 08/26/2023    Kidney stone 08/26/2023    Lung cancer (CMS/[facility]) 2012    nonsmall cell    Malignant neoplasm of lung (CMS/[facility]) 03/18/2019    chemo and radiation, remission for 6 years    Mixed hyperlipidemia 08/26/2023    Neuropathy 08/26/2023    Overactive bladder 08/26/2023    Personal history of malignant neoplasm, unspecified     History of cancer    Pulmonary embolism (CMS/[facility]) 2012    Squamous cell carcinoma lung, 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  Additional Instructions: SUPPLEMENT, Disp: , Rfl:     omeprazole (PriLOSEC) 40 mg DR capsule, Take 1 capsule (40 mg) by mouth once daily.  Additional Instructions: REFLUX, Disp: , Rfl:     oxybutynin XL (Ditropan-XL) 5 mg 24 hr tablet, Take 1 tablet (5 mg) by mouth once daily. Do not crush, chew, or split., Disp: , Rfl:     rosuvastatin (Crestor) 20 mg tablet, Take 1 tablet (20 mg) by mouth once daily., Disp: 90 tablet, Rfl: 3    torsemide (Demadex) 5 mg tablet, Take 1 tablet (5 mg) by mouth once daily., Disp: , Rfl:     valsartan (Diovan) 80 mg tablet, Take 1 tablet (80 mg) by mouth once daily., Disp: , Rfl:     venlafaxine XR (Effexor-XR) 37.5 mg 24 hr capsule, Take 1 capsule (37.5 mg) by mouth once daily. With food, Disp: , Rfl:      Allergies:  No Known Allergies     Physical Exam:  Last Recorded Vitals  Height 1.778 m (5' 10\"), weight 99.8 kg (220 lb).  General:     General appearance: Well-developed, well-nourished in no acute distress.       Voice:  normal       Head/face: Normal appearance; nontender to palpation     Facial nerve function: Normal and symmetric bilaterally.    Oral/oropharynx:     Oral vestibule: Normal labial and gingival mucosa     Tongue/floor of mouth: Normal without lesion     Oropharynx: Clear.  No lesions present of the hard/soft palate, posterior pharynx    Neck:     Neck: Normal appearance, trachea midline     Salivary glands: Normal to palpation bilaterally     Lymph nodes: No cervical lymphadenopathy to palpation     Thyroid: No thyromegaly.  No palpable nodules     Range of motion: Normal    Neurological:     Cortical functions: Alert and oriented x3, appropriate affect       Larynx/hypopharynx:     Laryngeal findings: Mirror exam inadequate or limited secondary to enlarged base of tongue and/or excessive gagging    Ear:     Ear canal: " Normal bilaterally after cleaning dense cerumen impaction on the right and minimal cerumen on the left     Tympanic membrane: Intact and mobile bilaterally     Pinna: Normal bilaterally     Hearing:  Gross hearing assessment normal by voice    Nose:     Visualized using: Anterior rhinoscopy     Nasopharynx: Inadequate mirror exam secondary to gag, anatomy.       Nasal dorsum: Nontraumatic midline appearance     Septum: Midline     Inferior turbinates: Normally sized     Mucosa: Bilateral, pink, normal appearing       Assessment/Plan   Continue antacid.  Right ear cleaned.  We will see him back in 6 months and sooner as needed         Alexandre Joyner MD

## 2024-04-30 ENCOUNTER — CLINICAL SUPPORT (OUTPATIENT)
Dept: AUDIOLOGY | Facility: CLINIC | Age: 84
End: 2024-04-30
Payer: MEDICARE

## 2024-04-30 DIAGNOSIS — H90.3 SENSORINEURAL HEARING LOSS (SNHL) OF BOTH EARS: Primary | ICD-10-CM

## 2024-04-30 PROCEDURE — HRANC PR HEARING AID NO CHARGE: Performed by: AUDIOLOGIST

## 2024-04-30 NOTE — PROGRESS NOTES
HEARING AID EVALUATION    The patient was seen today for a hearing aid evaluation.  He wanted to determine what will be new in then next few months.  Let him know that [employer] will come out with a new product in August.  Also discussed the possibility of trying our [employer]ound Nexia demo hearing aids for a few weeks.  He will come back in a few weeks and borrow our [employer] loaner hearing aids for a few weeks.  Tested his word recognition ability, and it decreased to 61% in the right ear and 76% in the left ear.  Counseled the patient on realistic expectations with hearing aids with those scores.  Let him know that he could potentially be evaluated for a cochlear implant.  He is not interested in that at that time.      APPOINTMENT TIME: 11:30-12:00

## 2024-05-03 ENCOUNTER — APPOINTMENT (OUTPATIENT)
Dept: PRIMARY CARE | Facility: CLINIC | Age: 84
End: 2024-05-03
Payer: MEDICARE

## 2024-05-06 PROBLEM — J31.0 CHRONIC RHINITIS: Status: RESOLVED | Noted: 2024-05-06 | Resolved: 2024-05-06

## 2024-05-06 PROBLEM — F32.A DEPRESSIVE DISORDER: Status: RESOLVED | Noted: 2019-03-18 | Resolved: 2024-05-06

## 2024-05-06 PROBLEM — H61.20 WAX IN EAR: Status: RESOLVED | Noted: 2024-05-06 | Resolved: 2024-05-06

## 2024-05-06 PROBLEM — Z86.718 HISTORY OF THROMBOEMBOLISM OF VEIN: Status: RESOLVED | Noted: 2023-08-26 | Resolved: 2024-05-06

## 2024-05-06 PROBLEM — M16.10 ARTHRITIS OF HIP: Status: RESOLVED | Noted: 2023-08-26 | Resolved: 2024-05-06

## 2024-05-06 PROBLEM — N18.30 STAGE 3 CHRONIC KIDNEY DISEASE (MULTI): Status: RESOLVED | Noted: 2023-11-19 | Resolved: 2024-05-06

## 2024-05-06 PROBLEM — Z85.9 HISTORY OF MALIGNANT NEOPLASM: Status: RESOLVED | Noted: 2024-05-06 | Resolved: 2024-05-06

## 2024-05-06 PROBLEM — I10 HYPERTENSION: Status: RESOLVED | Noted: 2022-09-13 | Resolved: 2024-05-06

## 2024-05-06 PROBLEM — R21 RASH AND OTHER NONSPECIFIC SKIN ERUPTION: Status: RESOLVED | Noted: 2022-09-13 | Resolved: 2024-05-06

## 2024-05-06 PROBLEM — H90.3 SENSORINEURAL HEARING LOSS (SNHL) OF BOTH EARS: Status: RESOLVED | Noted: 2023-08-26 | Resolved: 2024-05-06

## 2024-05-06 PROBLEM — N40.0 BENIGN PROSTATIC HYPERPLASIA WITHOUT URINARY OBSTRUCTION: Status: RESOLVED | Noted: 2019-03-18 | Resolved: 2024-05-06

## 2024-05-06 RX ORDER — OXYBUTYNIN CHLORIDE 10 MG/1
10 TABLET, EXTENDED RELEASE ORAL DAILY
COMMUNITY
Start: 2024-03-07

## 2024-05-09 ENCOUNTER — OFFICE VISIT (OUTPATIENT)
Dept: PRIMARY CARE | Facility: CLINIC | Age: 84
End: 2024-05-09
Payer: MEDICARE

## 2024-05-09 ENCOUNTER — CLINICAL SUPPORT (OUTPATIENT)
Dept: AUDIOLOGY | Facility: CLINIC | Age: 84
End: 2024-05-09

## 2024-05-09 ENCOUNTER — LAB (OUTPATIENT)
Dept: LAB | Facility: LAB | Age: 84
End: 2024-05-09
Payer: MEDICARE

## 2024-05-09 VITALS
BODY MASS INDEX: 32.21 KG/M2 | SYSTOLIC BLOOD PRESSURE: 134 MMHG | HEART RATE: 78 BPM | HEIGHT: 70 IN | OXYGEN SATURATION: 95 % | DIASTOLIC BLOOD PRESSURE: 76 MMHG | TEMPERATURE: 97 F | WEIGHT: 225 LBS

## 2024-05-09 DIAGNOSIS — R53.83 OTHER FATIGUE: ICD-10-CM

## 2024-05-09 DIAGNOSIS — E78.2 MIXED HYPERLIPIDEMIA: ICD-10-CM

## 2024-05-09 DIAGNOSIS — E03.9 ACQUIRED HYPOTHYROIDISM: Primary | ICD-10-CM

## 2024-05-09 DIAGNOSIS — M1A.9XX0 CHRONIC GOUT WITHOUT TOPHUS, UNSPECIFIED CAUSE, UNSPECIFIED SITE: ICD-10-CM

## 2024-05-09 DIAGNOSIS — I25.118 ATHEROSCLEROTIC HEART DISEASE OF NATIVE CORONARY ARTERY WITH OTHER FORMS OF ANGINA PECTORIS (CMS-HCC): ICD-10-CM

## 2024-05-09 DIAGNOSIS — E03.9 ACQUIRED HYPOTHYROIDISM: ICD-10-CM

## 2024-05-09 DIAGNOSIS — Z01.89 ENCOUNTER FOR ROUTINE LABORATORY TESTING: ICD-10-CM

## 2024-05-09 DIAGNOSIS — I50.9 CONGESTIVE HEART FAILURE, UNSPECIFIED HF CHRONICITY, UNSPECIFIED HEART FAILURE TYPE (MULTI): ICD-10-CM

## 2024-05-09 DIAGNOSIS — G62.9 NEUROPATHY: ICD-10-CM

## 2024-05-09 DIAGNOSIS — I10 ESSENTIAL HYPERTENSION: ICD-10-CM

## 2024-05-09 DIAGNOSIS — N18.31 STAGE 3A CHRONIC KIDNEY DISEASE (CKD) (MULTI): ICD-10-CM

## 2024-05-09 DIAGNOSIS — K21.9 GASTROESOPHAGEAL REFLUX DISEASE WITHOUT ESOPHAGITIS: ICD-10-CM

## 2024-05-09 DIAGNOSIS — H90.3 SENSORINEURAL HEARING LOSS (SNHL) OF BOTH EARS: Primary | ICD-10-CM

## 2024-05-09 DIAGNOSIS — F32.A CHRONIC DEPRESSION: ICD-10-CM

## 2024-05-09 DIAGNOSIS — N32.81 OVERACTIVE BLADDER: ICD-10-CM

## 2024-05-09 LAB
ALBUMIN SERPL-MCNC: 4.7 G/DL (ref 3.5–5)
ALP BLD-CCNC: 103 U/L (ref 35–125)
ALT SERPL-CCNC: 19 U/L (ref 5–40)
ANION GAP SERPL CALC-SCNC: 11 MMOL/L
AST SERPL-CCNC: 25 U/L (ref 5–40)
BASOPHILS # BLD AUTO: 0.06 X10*3/UL (ref 0–0.1)
BASOPHILS NFR BLD AUTO: 1 %
BILIRUB SERPL-MCNC: 0.7 MG/DL (ref 0.1–1.2)
BNP SERPL-MCNC: 87 PG/ML (ref 0–99)
BUN SERPL-MCNC: 26 MG/DL (ref 8–25)
CALCIUM SERPL-MCNC: 10 MG/DL (ref 8.5–10.4)
CHLORIDE SERPL-SCNC: 106 MMOL/L (ref 97–107)
CHOLEST SERPL-MCNC: 165 MG/DL (ref 133–200)
CHOLEST/HDLC SERPL: 2.3 {RATIO}
CK SERPL-CCNC: 92 U/L (ref 24–195)
CO2 SERPL-SCNC: 28 MMOL/L (ref 24–31)
CREAT SERPL-MCNC: 1.4 MG/DL (ref 0.4–1.6)
EGFRCR SERPLBLD CKD-EPI 2021: 49 ML/MIN/1.73M*2
EOSINOPHIL # BLD AUTO: 0.29 X10*3/UL (ref 0–0.4)
EOSINOPHIL NFR BLD AUTO: 4.8 %
ERYTHROCYTE [DISTWIDTH] IN BLOOD BY AUTOMATED COUNT: 14.2 % (ref 11.5–14.5)
FOLATE SERPL-MCNC: 16.2 NG/ML (ref 4.2–19.9)
GLUCOSE SERPL-MCNC: 94 MG/DL (ref 65–99)
HCT VFR BLD AUTO: 50.4 % (ref 41–52)
HDLC SERPL-MCNC: 73 MG/DL
HGB BLD-MCNC: 16.3 G/DL (ref 13.5–17.5)
IMM GRANULOCYTES # BLD AUTO: 0.03 X10*3/UL (ref 0–0.5)
IMM GRANULOCYTES NFR BLD AUTO: 0.5 % (ref 0–0.9)
LDLC SERPL CALC-MCNC: 64 MG/DL (ref 65–130)
LYMPHOCYTES # BLD AUTO: 1.32 X10*3/UL (ref 0.8–3)
LYMPHOCYTES NFR BLD AUTO: 21.7 %
MCH RBC QN AUTO: 32.6 PG (ref 26–34)
MCHC RBC AUTO-ENTMCNC: 32.3 G/DL (ref 32–36)
MCV RBC AUTO: 101 FL (ref 80–100)
MONOCYTES # BLD AUTO: 0.51 X10*3/UL (ref 0.05–0.8)
MONOCYTES NFR BLD AUTO: 8.4 %
NEUTROPHILS # BLD AUTO: 3.88 X10*3/UL (ref 1.6–5.5)
NEUTROPHILS NFR BLD AUTO: 63.6 %
NRBC BLD-RTO: 0 /100 WBCS (ref 0–0)
PLATELET # BLD AUTO: 215 X10*3/UL (ref 150–450)
POTASSIUM SERPL-SCNC: 4.7 MMOL/L (ref 3.4–5.1)
PROT SERPL-MCNC: 7.2 G/DL (ref 5.9–7.9)
RBC # BLD AUTO: 5 X10*6/UL (ref 4.5–5.9)
SODIUM SERPL-SCNC: 145 MMOL/L (ref 133–145)
TRIGL SERPL-MCNC: 142 MG/DL (ref 40–150)
TSH SERPL DL<=0.05 MIU/L-ACNC: 3.61 MIU/L (ref 0.27–4.2)
URATE SERPL-MCNC: 5.2 MG/DL (ref 3.6–7.7)
VIT B12 SERPL-MCNC: 897 PG/ML (ref 211–946)
WBC # BLD AUTO: 6.1 X10*3/UL (ref 4.4–11.3)

## 2024-05-09 PROCEDURE — 80053 COMPREHEN METABOLIC PANEL: CPT

## 2024-05-09 PROCEDURE — 1159F MED LIST DOCD IN RCRD: CPT | Performed by: INTERNAL MEDICINE

## 2024-05-09 PROCEDURE — 82746 ASSAY OF FOLIC ACID SERUM: CPT

## 2024-05-09 PROCEDURE — 1159F MED LIST DOCD IN RCRD: CPT | Performed by: AUDIOLOGIST

## 2024-05-09 PROCEDURE — 3078F DIAST BP <80 MM HG: CPT | Performed by: INTERNAL MEDICINE

## 2024-05-09 PROCEDURE — 85025 COMPLETE CBC W/AUTO DIFF WBC: CPT

## 2024-05-09 PROCEDURE — 1160F RVW MEDS BY RX/DR IN RCRD: CPT | Performed by: INTERNAL MEDICINE

## 2024-05-09 PROCEDURE — 84550 ASSAY OF BLOOD/URIC ACID: CPT

## 2024-05-09 PROCEDURE — 36415 COLL VENOUS BLD VENIPUNCTURE: CPT

## 2024-05-09 PROCEDURE — 83880 ASSAY OF NATRIURETIC PEPTIDE: CPT

## 2024-05-09 PROCEDURE — HRANC PR HEARING AID NO CHARGE: Performed by: AUDIOLOGIST

## 2024-05-09 PROCEDURE — 99214 OFFICE O/P EST MOD 30 MIN: CPT | Performed by: INTERNAL MEDICINE

## 2024-05-09 PROCEDURE — 1126F AMNT PAIN NOTED NONE PRSNT: CPT | Performed by: INTERNAL MEDICINE

## 2024-05-09 PROCEDURE — 84443 ASSAY THYROID STIM HORMONE: CPT

## 2024-05-09 PROCEDURE — G2211 COMPLEX E/M VISIT ADD ON: HCPCS | Performed by: INTERNAL MEDICINE

## 2024-05-09 PROCEDURE — 82607 VITAMIN B-12: CPT

## 2024-05-09 PROCEDURE — 82550 ASSAY OF CK (CPK): CPT

## 2024-05-09 PROCEDURE — 3075F SYST BP GE 130 - 139MM HG: CPT | Performed by: INTERNAL MEDICINE

## 2024-05-09 PROCEDURE — 80061 LIPID PANEL: CPT

## 2024-05-09 ASSESSMENT — ENCOUNTER SYMPTOMS
OCCASIONAL FEELINGS OF UNSTEADINESS: 1
GASTROINTESTINAL NEGATIVE: 1
LOSS OF SENSATION IN FEET: 1
COUGH: 0
SHORTNESS OF BREATH: 1
WEAKNESS: 1
CHEST TIGHTNESS: 0
APNEA: 0
DEPRESSION: 0
ARTHRALGIAS: 1
MYALGIAS: 1

## 2024-05-09 ASSESSMENT — PATIENT HEALTH QUESTIONNAIRE - PHQ9
2. FEELING DOWN, DEPRESSED OR HOPELESS: NOT AT ALL
SUM OF ALL RESPONSES TO PHQ9 QUESTIONS 1 AND 2: 0
1. LITTLE INTEREST OR PLEASURE IN DOING THINGS: NOT AT ALL

## 2024-05-09 ASSESSMENT — PAIN SCALES - GENERAL: PAINLEVEL: 0-NO PAIN

## 2024-05-09 NOTE — PATIENT INSTRUCTIONS
"Casey Hooper to make a few changes today first of I will go ahead and stop your Crestor and we are going to give you a \"drug holiday\" from that medication I will see you in a couple months to see if that made any difference in your muscle aches and your muscle strength.    I am ordering a bunch of blood test today to see if anything else seems to be out of sorts I will let you know about that.  I think you know that I think you be better off to take your diuretic but I know that you do not want to take it for quality-of-life reasons.  Let me see what your blood work looks like and we may need to negotiate a little bit on that point.    I look forward to seeing you in 2 months.  "

## 2024-05-09 NOTE — PROGRESS NOTES
[facility]: [facility]  PROGRESS NOTE      Joseph Hicks is a 83 y.o. male that is presenting today for Follow-up.    Assessment/Plan   Diagnoses and all orders for this visit:  Acquired hypothyroidism  Mixed hyperlipidemia  -     Follow Up In Primary Care - Established  Neuropathy  Overactive bladder  Stage 3a chronic kidney disease (CKD) (Multi)  Chronic gout without tophus, unspecified cause, unspecified site  -     Uric acid; Future  Atherosclerotic heart disease of native coronary artery with other forms of angina pectoris (CMS-[facility])  Essential hypertension  -     Comprehensive Metabolic Panel; Future  Gastroesophageal reflux disease without esophagitis  Chronic depression  Encounter for routine laboratory testing  -     Vitamin B12; Future  -     Folate; Future  Other fatigue  -     CBC and Auto Differential; Future  -     TSH with reflex to Free T4 if abnormal; Future  -     CK; Future  Congestive heart failure, unspecified HF chronicity, unspecified heart failure type (Multi)  -     B-type natriuretic peptide; Future  Other orders  -     Follow Up In Primary Care - Established; Future  I reviewed the whole situation.  I really wonder if he could have a statin induced muscle aches were going to give him a drug holiday from his Crestor.  I am going to check a bunch of labs to see if anything reveals itself is being significant and I will just see him back in a couple months to see how he is coming along.  I really do not think he has any significant congestive heart failure.  I will check his proBNP today if that is elevated I might really need to needle him about taking his diuretic more often.  Kika Peña sees me for follow-up today.  For the most part he has been doing okay except for the following he feels like his muscles are weaker than before and he does have a lot of muscle aches and wonders if it could be any of his medications.  He also has some chronic low-grade  shortness of breath related to his congestive heart failure.  He had been taking a diuretic but he decided that the diuresis was worse than the shortness of breath and therefore had stopped it.  There really has not been any progression of the symptoms in that regard.      Review of Systems   Respiratory:  Positive for shortness of breath. Negative for apnea, cough and chest tightness.    Cardiovascular:  Positive for chest pain and leg swelling.   Gastrointestinal: Negative.    Musculoskeletal:  Positive for arthralgias and myalgias.   Neurological:  Positive for weakness.      Objective   Vitals:    05/09/24 1616   BP: 134/76   Pulse: 78   Temp: 36.1 °C (97 °F)   SpO2: 95%      Body mass index is 32.28 kg/m².  Physical Exam  Cardiovascular:      Rate and Rhythm: Normal rate and regular rhythm.      Pulses: Normal pulses.      Heart sounds: Normal heart sounds.   Pulmonary:      Effort: Pulmonary effort is normal.      Breath sounds: Normal breath sounds.   Abdominal:      General: Abdomen is flat. Bowel sounds are normal.      Palpations: Abdomen is soft.   Musculoskeletal:      Cervical back: Normal range of motion and neck supple.      Right lower leg: Edema present.      Left lower leg: Edema present.      Comments: Slight edema still   Skin:     General: Skin is warm.       Diagnostic Results   Lab Results   Component Value Date    GLUCOSE 94 10/27/2023    CALCIUM 9.7 10/27/2023     10/27/2023    K 4.5 10/27/2023    CO2 26 10/27/2023     10/27/2023    BUN 25 10/27/2023    CREATININE 1.30 10/27/2023     Lab Results   Component Value Date    ALT 20 10/27/2023    AST 24 10/27/2023    ALKPHOS 92 10/27/2023    BILITOT 0.9 10/27/2023     Lab Results   Component Value Date    WBC 5.4 10/27/2023    HGB 16.0 10/27/2023    HCT 49.2 10/27/2023     10/27/2023     10/27/2023     Lab Results   Component Value Date    CHOL 152 10/27/2023    CHOL 153 03/16/2023    CHOL 149 05/27/2022     Lab Results  "  Component Value Date    HDL 70.0 10/27/2023    HDL 68 03/16/2023    HDL 68 05/27/2022     Lab Results   Component Value Date    LDLCALC 62 (L) 10/27/2023    LDLCALC 65 03/16/2023    LDLCALC 59 (L) 05/27/2022     Lab Results   Component Value Date    TRIG 99 10/27/2023    TRIG 102 03/16/2023    TRIG 112 05/27/2022     No components found for: \"CHOLHDL\"  Lab Results   Component Value Date    HGBA1C 5.7 05/27/2022     Other labs not included in the list above were reviewed either before or during this encounter.    History    Past Medical History:   Diagnosis Date    Airway clearance impairment 08/26/2023    Angina pectoris (CMS-H[facility]) 08/26/2023    Arthritis of hip 08/26/2023    Arthritis of right hip 08/26/2023    At risk for bleeding 08/26/2023    Atherosclerotic heart disease of native coronary artery with other forms of angina pectoris (CMS-[facility]) 03/18/2019    Benign prostatic hyperplasia without urinary obstruction 03/18/2019    Bilateral sensorineural hearing loss 08/26/2023    Chronic rhinitis 05/06/2024    Congestive heart failure (Multi) 11/19/2023    Coronary arteriosclerosis 03/18/2019    Depression 08/26/2023    Depressive disorder 03/18/2019    Essential hypertension 08/26/2023    External hemorrhoids 08/26/2023    Gastroesophageal reflux disease 08/26/2023    Gout 08/26/2023    History of lung cancer 10/26/2023    History of malignant neoplasm 05/06/2024    History of thromboembolism of vein 08/26/2023    History of venous thromboembolism 08/26/2023    Hypertension 09/13/2022    Hypothyroidism 08/26/2023    Impaired gas exchange 08/26/2023    Kidney stone 08/26/2023    Lung cancer (Multi) 2012    nonsmall cell    Malignant neoplasm of lung (Multi) 03/18/2019    chemo and radiation, remission for 6 years    Mixed hyperlipidemia 08/26/2023    Neuropathy 08/26/2023    Overactive bladder 08/26/2023    Personal history of malignant neoplasm, unspecified     History of cancer    Primary malignant neoplasm of " lung (Multi) 09/20/2012    Pulmonary embolism (Multi) 2012    Rash and other nonspecific skin eruption 09/13/2022    Sensorineural hearing loss (SNHL) of both ears 08/26/2023    Squamous cell carcinoma lung, 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    Stage 3 chronic kidney disease (Multi) 11/19/2023    Stage 3a chronic kidney disease (CKD) (Multi) 11/19/2023    Wax in ear 05/06/2024     Past Surgical History:   Procedure Laterality Date    CARDIAC CATHETERIZATION  2013    COLONOSCOPY  2013    CT GUIDED IMAGING FOR NEEDLE PLACEMENT  09/26/2012    CT GUIDED IMAGING FOR NEEDLE PLACEMENT [facility]    OTHER SURGICAL HISTORY  08/24/2022    Hip replacement    TOTAL HIP ARTHROPLASTY Left      Family History   Problem Relation Name Age of Onset    Hypothyroidism Daughter       Social History     Socioeconomic History    Marital status:      Spouse name: Not on file    Number of children: Not on file    Years of education: Not on file    Highest education level: Not on file   Occupational History    Not on file   Tobacco Use    Smoking status: Former     Types: Cigarettes     Passive exposure: Never    Smokeless tobacco: Never   Vaping Use    Vaping status: Never Used   Substance and Sexual Activity    Alcohol use: Yes     Alcohol/week: 6.0 standard drinks of alcohol     Types: 6 Standard drinks or equivalent per week    Drug use: Never    Sexual activity: Never   Other Topics Concern    Not on file   Social History Narrative    Not on file     Social Determinants of Health     Financial Resource Strain: Not on file   Food Insecurity: Not on file   Transportation Needs: Not on file   Physical Activity: Not on file   Stress: Not on file   Social Connections: Not on file   Intimate Partner Violence: Not on file   Housing Stability: Not on file     No Known Allergies  Current Outpatient Medications on File Prior to Visit   Medication Sig Dispense Refill    allopurinol (Zyloprim) 100 mg tablet Take 1 tablet (100 mg) by mouth  once daily. 90 tablet 3    aspirin 81 mg EC tablet Take 1 tablet (81 mg) by mouth once daily.      levothyroxine (Synthroid, Levoxyl) 50 mcg tablet Take 1 tablet (50 mcg) by mouth once daily.      multivitamin with minerals (multivitamin with folic acid) tablet Take 1 tablet by mouth once daily.  Additional Instructions: SUPPLEMENT      omeprazole (PriLOSEC) 40 mg DR capsule Take 1 capsule (40 mg) by mouth once daily.  Additional Instructions: REFLUX      oxybutynin XL (Ditropan-XL) 10 mg 24 hr tablet Take 1 tablet (10 mg) by mouth once daily.      torsemide (Demadex) 5 mg tablet Take 1 tablet (5 mg) by mouth once daily.      valsartan (Diovan) 80 mg tablet Take 1 tablet (80 mg) by mouth once daily.      venlafaxine XR (Effexor-XR) 37.5 mg 24 hr capsule Take 1 capsule (37.5 mg) by mouth once daily. With food      [DISCONTINUED] rosuvastatin (Crestor) 20 mg tablet Take 1 tablet (20 mg) by mouth once daily. 90 tablet 3    [DISCONTINUED] oxybutynin XL (Ditropan-XL) 5 mg 24 hr tablet Take 1 tablet (5 mg) by mouth once daily. Do not crush, chew, or split.       No current facility-administered medications on file prior to visit.     Immunization History   Administered Date(s) Administered    Flu vaccine, quadrivalent, high-dose, preservative free, age 65y+ (FLUZONE) 09/13/2020, 10/01/2020, 09/25/2021, 10/10/2022, 09/26/2023    Influenza, High Dose Seasonal, Preservative Free 10/02/2017, 10/02/2018, 10/04/2019    Influenza, injectable, quadrivalent 09/23/2016    Influenza, seasonal, injectable 11/02/2013, 10/01/2014, 10/06/2015    Moderna COVID-19 vaccine, Fall 2023, 12 yeasrs and older (50mcg/0.5mL) 10/29/2023    Moderna COVID-19 vaccine, bivalent, blue cap/gray label *Check age/dose* 12/06/2022, 06/16/2023    Moderna SARS-CoV-2 Vaccination 01/15/2021, 02/01/2021, 02/16/2021, 03/02/2021, 11/04/2021, 08/16/2022    Pneumococcal conjugate vaccine, 13-valent (PREVNAR 13) 05/08/2015    Pneumococcal polysaccharide vaccine,  23-valent, age 2 years and older (PNEUMOVAX 23) 05/02/2014, 11/06/2020    RSV, 60 Years And Older (AREXVY) 10/29/2023    Tdap vaccine, age 7 year and older (BOOSTRIX, ADACEL) 10/04/2017    Zoster vaccine, recombinant, adult (SHINGRIX) 01/05/2018, 04/08/2018, 07/20/2018, 05/01/2019    Zoster, live 01/02/2012     Patient's medical history was reviewed and updated either before or during this encounter.       Bogdan Busby MD

## 2024-05-09 NOTE — PROGRESS NOTES
HEARING AID EVALUATION    The patient was seen today for a hearing aid evaluation.  He currently wears [employer] M90 R hearing aids.  Programmed a set of [employer] Nexia 9 rechargeable RICs for the patient to try for a few weeks.  If he hears well with the hearing aids he will keep them.  If not, he will wait until [employer]'s new model comes out in August.        APPOINTMENT TIME: 2:00-3:00

## 2024-05-10 ENCOUNTER — APPOINTMENT (OUTPATIENT)
Dept: AUDIOLOGY | Facility: CLINIC | Age: 84
End: 2024-05-10
Payer: MEDICARE

## 2024-05-24 ENCOUNTER — OFFICE VISIT (OUTPATIENT)
Dept: CARDIOLOGY | Facility: CLINIC | Age: 84
End: 2024-05-24
Payer: MEDICARE

## 2024-05-24 VITALS
HEART RATE: 69 BPM | TEMPERATURE: 98.9 F | DIASTOLIC BLOOD PRESSURE: 86 MMHG | OXYGEN SATURATION: 98 % | WEIGHT: 223 LBS | BODY MASS INDEX: 30.2 KG/M2 | HEIGHT: 72 IN | RESPIRATION RATE: 18 BRPM | SYSTOLIC BLOOD PRESSURE: 125 MMHG

## 2024-05-24 DIAGNOSIS — I25.118 ATHEROSCLEROTIC HEART DISEASE OF NATIVE CORONARY ARTERY WITH OTHER FORMS OF ANGINA PECTORIS (CMS-HCC): Primary | ICD-10-CM

## 2024-05-24 DIAGNOSIS — I10 ESSENTIAL HYPERTENSION: ICD-10-CM

## 2024-05-24 DIAGNOSIS — E78.2 MIXED HYPERLIPIDEMIA: ICD-10-CM

## 2024-05-24 PROCEDURE — 1160F RVW MEDS BY RX/DR IN RCRD: CPT | Performed by: INTERNAL MEDICINE

## 2024-05-24 PROCEDURE — 3074F SYST BP LT 130 MM HG: CPT | Performed by: INTERNAL MEDICINE

## 2024-05-24 PROCEDURE — 3079F DIAST BP 80-89 MM HG: CPT | Performed by: INTERNAL MEDICINE

## 2024-05-24 PROCEDURE — 1036F TOBACCO NON-USER: CPT | Performed by: INTERNAL MEDICINE

## 2024-05-24 PROCEDURE — 1126F AMNT PAIN NOTED NONE PRSNT: CPT | Performed by: INTERNAL MEDICINE

## 2024-05-24 PROCEDURE — 99213 OFFICE O/P EST LOW 20 MIN: CPT | Performed by: INTERNAL MEDICINE

## 2024-05-24 PROCEDURE — 1159F MED LIST DOCD IN RCRD: CPT | Performed by: INTERNAL MEDICINE

## 2024-05-24 ASSESSMENT — LIFESTYLE VARIABLES
HAS A RELATIVE, FRIEND, DOCTOR, OR ANOTHER HEALTH PROFESSIONAL EXPRESSED CONCERN ABOUT YOUR DRINKING OR SUGGESTED YOU CUT DOWN: NO
AUDIT TOTAL SCORE: 1
SKIP TO QUESTIONS 9-10: 1
HOW OFTEN DO YOU HAVE SIX OR MORE DRINKS ON ONE OCCASION: NEVER
HAVE YOU OR SOMEONE ELSE BEEN INJURED AS A RESULT OF YOUR DRINKING: NO
AUDIT-C TOTAL SCORE: 1
HOW MANY STANDARD DRINKS CONTAINING ALCOHOL DO YOU HAVE ON A TYPICAL DAY: 1 OR 2
HOW OFTEN DO YOU HAVE A DRINK CONTAINING ALCOHOL: MONTHLY OR LESS

## 2024-05-24 ASSESSMENT — ENCOUNTER SYMPTOMS
DEPRESSION: 0
OCCASIONAL FEELINGS OF UNSTEADINESS: 0
LOSS OF SENSATION IN FEET: 0

## 2024-05-24 ASSESSMENT — PAIN SCALES - GENERAL: PAINLEVEL: 0-NO PAIN

## 2024-05-24 ASSESSMENT — PATIENT HEALTH QUESTIONNAIRE - PHQ9
SUM OF ALL RESPONSES TO PHQ9 QUESTIONS 1 AND 2: 0
1. LITTLE INTEREST OR PLEASURE IN DOING THINGS: NOT AT ALL
2. FEELING DOWN, DEPRESSED OR HOPELESS: NOT AT ALL

## 2024-05-24 NOTE — PROGRESS NOTES
Subjective   Chief Complaint   Patient presents with    Follow-up     Mr. Burns is present for his 6 month Follow up with Dr. Severino, pt will like to discuss concerns       83 yr old male patient with history of hypertension hyper PCI of LAD LCX.  Patient had echocardiogram done September last year found to be normal ejection fraction trivial tricuspid regurgitation seen.  Patient has a limited functionality.  Does get tiredness fatigue with the moderate activity.  Patient had a BMP done which was unremarkable 87 TSH 2 weeks ago was unremarkable.  Lipid profile 2 weeks ago showed a total cholesterol 165 I LDL is 64 mg distribute the triglycerides 142.  Comprehensive metabolic panel was unremarkable creatinine 1.4.  CBC was done 2 weeks ago essentially within normal range.   Patient currently on aspirin 81 mg daily, Ditropan, Diovan 80 mg as well as allopurinol.    Past Medical History:   Diagnosis Date    Airway clearance impairment 08/26/2023    Angina pectoris (CMS-HCC) 08/26/2023    Arthritis of hip 08/26/2023    Arthritis of right hip 08/26/2023    At risk for bleeding 08/26/2023    Atherosclerotic heart disease of native coronary artery with other forms of angina pectoris (CMS-HCC) 03/18/2019    Benign prostatic hyperplasia without urinary obstruction 03/18/2019    Bilateral sensorineural hearing loss 08/26/2023    Chronic rhinitis 05/06/2024    Congestive heart failure (Multi) 11/19/2023    Coronary arteriosclerosis 03/18/2019    Depression 08/26/2023    Depressive disorder 03/18/2019    Essential hypertension 08/26/2023    External hemorrhoids 08/26/2023    Gastroesophageal reflux disease 08/26/2023    Gout 08/26/2023    History of lung cancer 10/26/2023    History of malignant neoplasm 05/06/2024    History of thromboembolism of vein 08/26/2023    History of venous thromboembolism 08/26/2023    Hypertension 09/13/2022    Hypothyroidism 08/26/2023    Impaired gas exchange 08/26/2023    Kidney stone  08/26/2023    Lung cancer (Multi) 2012    nonsmall cell    Malignant neoplasm of lung (Multi) 03/18/2019    chemo and radiation, remission for 6 years    Mixed hyperlipidemia 08/26/2023    Neuropathy 08/26/2023    Overactive bladder 08/26/2023    Personal history of malignant neoplasm, unspecified     History of cancer    Primary malignant neoplasm of lung (Multi) 09/20/2012    Pulmonary embolism (Multi) 2012    Rash and other nonspecific skin eruption 09/13/2022    Sensorineural hearing loss (SNHL) of both ears 08/26/2023    Squamous cell carcinoma lung, 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  Current Outpatient Medications   Medication Sig Dispense Refill    allopurinol (Zyloprim) 100 mg tablet Take 1 tablet (100 mg) by mouth once daily. 90 tablet 3    aspirin 81 mg EC tablet Take 1 tablet (81 mg) by mouth once daily.      levothyroxine (Synthroid, Levoxyl) 50 mcg tablet Take 1 tablet (50 mcg) by mouth once daily.      multivitamin with minerals (multivitamin with folic acid) tablet Take 1 tablet by mouth once daily.  Additional Instructions: SUPPLEMENT      omeprazole (PriLOSEC) 40 mg DR capsule Take 1 capsule (40 mg) by mouth once daily.  Additional Instructions: REFLUX      oxybutynin XL (Ditropan-XL) 10 mg 24 hr tablet Take 1 tablet (10 mg) by mouth once daily.      valsartan (Diovan) 80 mg tablet Take 1 tablet (80 mg) by mouth once daily.      venlafaxine XR (Effexor-XR) 37.5 mg 24 hr capsule Take 1 capsule  (37.5 mg) by mouth once daily. With food       No current facility-administered medications for this visit.      reports that he quit smoking about 12 years ago. His smoking use included cigarettes. He has never been exposed to tobacco smoke. He has never used smokeless tobacco. He reports current alcohol use of about 6.0 standard drinks of alcohol per week. He reports that he does not use drugs.  Patient has no known allergies.  Mixed hyperlipidemia  Essential hypertension    Vitals:    05/24/24 0936   BP: 125/86   Pulse: 69   Resp: 18   Temp: 37.2 °C (98.9 °F)   TempSrc: Core   SpO2: 98%   Weight: 101 kg (223 lb)   Height: 1.829 m (6')   PainSc: 0-No pain      BMI:Body mass index is 30.24 kg/m².   General Cardiology:  General Appearance: Alert, oriented and in no acute distress.  HEENT: extra ocular movements intact (EOMI), pupils equal,  round, reactive to light and accommodation (PERRLA).  Carotid Upstroke: no bruit, normal.  Jugular Venous Distention (JVD): flat.  Chest: normal.  Lungs: Clear to auscultation,   Heart Sounds: no S3 or S4, normal S1, S2, regular rate.  Murmur, Click, Gallop: soft systolic murmur.  Abdomen: no hepatomegaly, no masses felt, soft.  Extremities: trace leg edema.  Peripheral pulses: 2 plus bilateral.  NEUROLOGY Cranial nerves II-XII grossly intact.     Patient Active Problem List   Diagnosis    At risk for bleeding    Overactive bladder    Neuropathy    Malignant neoplasm of lung (Multi)    Hypothyroidism    Mixed hyperlipidemia    History of venous thromboembolism    Gout    Gastroesophageal reflux disease    Essential hypertension    Chronic depression    Atherosclerotic heart disease of native coronary artery with other forms of angina pectoris (CMS-HCC)    Bilateral sensorineural hearing loss    Arthritis of right hip    Angina pectoris (CMS-HCC)    Activity intolerance    History of lung cancer    BPH without urinary obstruction    Congestive heart failure (Multi)    Hearing loss     Retinal detachment    Squamous cell carcinoma lung, right (Multi)    Stage 3a chronic kidney disease (CKD) (Multi)       Problem List Items Addressed This Visit       Mixed hyperlipidemia    Essential hypertension    Atherosclerotic heart disease of native coronary artery with other forms of angina pectoris (CMS-HCC) - Primary      83-year-old male patient with a history of hyperlipidemia, hypertension hyperlipidemia CAD, PCI of LCx in the past.  1.  CAD: Continue current aspirin and valsartan.  Patient recently just stopped statin therapy due to muscle soreness.  Advised patient to go back on a half a tablet of statin therapy for CAD risk reduction.  2.  Hypertension: Continue current valsartan.Diet and exercise reviewed with patient..advice to walk about 10,000 steps or about 2 hours during day time. Cut back on salt, sugar and flour.  Advised patient to avoid lunch meats, canned soups, pizzas, bread rolls, and sandwiches. Advised patient to limit salt intake 1,500 mg daily. Advised patient to exercise 30 mins/3 times a week including treadmill or aerobic type, Goal to achieve 65% target HR.    Erick Severino MD

## 2024-06-06 ENCOUNTER — TELEPHONE (OUTPATIENT)
Dept: PRIMARY CARE | Facility: CLINIC | Age: 84
End: 2024-06-06
Payer: MEDICARE

## 2024-06-06 DIAGNOSIS — G89.29 CHRONIC LEFT HIP PAIN: ICD-10-CM

## 2024-06-06 DIAGNOSIS — M25.552 CHRONIC LEFT HIP PAIN: ICD-10-CM

## 2024-07-05 NOTE — PROGRESS NOTES
[facility]: [facility]  PROGRESS NOTE      Joseph Hicks is a 83 y.o. male that is presenting today for Follow-up.    Assessment/Plan   Diagnoses and all orders for this visit:  Essential hypertension  -     CBC and Auto Differential; Future  -     Comprehensive Metabolic Panel; Future  Chronic diastolic congestive heart failure (Multi)  Gastroesophageal reflux disease without esophagitis  Atherosclerotic heart disease of native coronary artery with other forms of angina pectoris (CMS-HCC)  Acquired hypothyroidism  -     TSH with reflex to Free T4 if abnormal; Future  Mixed hyperlipidemia  -     Lipid Panel; Future  Overactive bladder  Stage 3a chronic kidney disease (CKD) (Multi)  Other orders  -     Follow Up In [facility] - [facility]  -     Follow Up In Primary Care - Medicare Annual; Future  We have considered the whole situation.  We have also considered the current diagnoses.  Here is where I think we are.  From the perspective of his blood pressure he is clearly stable.  From the perspective of heart failure I think he has had a history of some diastolic heart failure but I do not believe that he is currently in diastolic heart failure.  I have this position because his BNP is normal and the only clinical manifestation is some swelling in the legs which could just be from venous insufficiency.  Nevertheless he does not want to take more diuretic anyway therefore lets just leave the medicine program as it is and we will reassess things in 3 months.    He is clinically euthyroid right now.    The perspective of the cholesterol we have decided to leave him off the Crestor for another fabienne when I see him back in October for his checkup we can decide whether or not we wish to restart the Crestor I will check blood work to help us in that decision as well.  Subjective   The patient sees me for follow-up I seen him in May.  At that time we were concerned that he had a lot of  achiness related to Crestor therapy and had stopped his Crestor.  This was a short interval follow-up to see how he felt off of the Crestor.  On the good side he probably feels a bit better although he is having a hard time confirming that because he is also dealing with a bursitis in the left hip area for which she is seeing Dr. Turcios.  Neither confirm nor deny that he is better in terms of his muscle aches      Review of Systems   Respiratory:  Positive for shortness of breath.    Cardiovascular: Negative.    Gastrointestinal: Negative.    Genitourinary: Negative.       Objective   Vitals:    07/08/24 1535   BP: 118/74   Pulse: 89   Temp: 36.4 °C (97.5 °F)   SpO2: 95%      Body mass index is 31.85 kg/m².  Physical Exam  HENT:      Nose: Nose normal.   Eyes:      Pupils: Pupils are equal, round, and reactive to light.   Cardiovascular:      Rate and Rhythm: Normal rate.   Pulmonary:      Effort: Pulmonary effort is normal.      Breath sounds: Normal breath sounds.   Abdominal:      General: Abdomen is flat. Bowel sounds are normal.      Palpations: Abdomen is soft.   Musculoskeletal:         General: Normal range of motion.      Cervical back: Normal range of motion.   Skin:     General: Skin is warm and dry.   Neurological:      General: No focal deficit present.   Psychiatric:         Mood and Affect: Mood normal.       Diagnostic Results   Lab Results   Component Value Date    GLUCOSE 94 05/09/2024    CALCIUM 10.0 05/09/2024     05/09/2024    K 4.7 05/09/2024    CO2 28 05/09/2024     05/09/2024    BUN 26 (H) 05/09/2024    CREATININE 1.40 05/09/2024     Lab Results   Component Value Date    ALT 19 05/09/2024    AST 25 05/09/2024    ALKPHOS 103 05/09/2024    BILITOT 0.7 05/09/2024     Lab Results   Component Value Date    WBC 6.1 05/09/2024    HGB 16.3 05/09/2024    HCT 50.4 05/09/2024     (H) 05/09/2024     05/09/2024     Lab Results   Component Value Date    CHOL 165 05/09/2024    CHOL  "152 10/27/2023    CHOL 153 03/16/2023     Lab Results   Component Value Date    HDL 73.0 05/09/2024    HDL 70.0 10/27/2023    HDL 68 03/16/2023     Lab Results   Component Value Date    LDLCALC 64 (L) 05/09/2024    LDLCALC 62 (L) 10/27/2023    LDLCALC 65 03/16/2023     Lab Results   Component Value Date    TRIG 142 05/09/2024    TRIG 99 10/27/2023    TRIG 102 03/16/2023     No components found for: \"CHOLHDL\"  Lab Results   Component Value Date    HGBA1C 5.7 05/27/2022     Other labs not included in the list above were reviewed either before or during this encounter.    History    Past Medical History:   Diagnosis Date    Airway clearance impairment 08/26/2023    Angina pectoris (CMS-[facility]) 08/26/2023    Arthritis of hip 08/26/2023    Arthritis of right hip 08/26/2023    At risk for bleeding 08/26/2023    Atherosclerotic heart disease of native coronary artery with other forms of angina pectoris (CMS-HCC) 03/18/2019    Benign prostatic hyperplasia without urinary obstruction 03/18/2019    Bilateral sensorineural hearing loss 08/26/2023    Chronic rhinitis 05/06/2024    Congestive heart failure (Multi) 11/19/2023    Coronary arteriosclerosis 03/18/2019    Depression 08/26/2023    Depressive disorder 03/18/2019    Essential hypertension 08/26/2023    External hemorrhoids 08/26/2023    Gastroesophageal reflux disease 08/26/2023    Gout 08/26/2023    History of lung cancer 10/26/2023    History of malignant neoplasm 05/06/2024    History of thromboembolism of vein 08/26/2023    History of venous thromboembolism 08/26/2023    Hypertension 09/13/2022    Hypothyroidism 08/26/2023    Impaired gas exchange 08/26/2023    Kidney stone 08/26/2023    Lung cancer (Multi) 2012    nonsmall cell    Malignant neoplasm of lung (Multi) 03/18/2019    chemo and radiation, remission for 6 years    Mixed hyperlipidemia 08/26/2023    Neuropathy 08/26/2023    Overactive bladder 08/26/2023    Personal history of malignant neoplasm, unspecified  "    History of cancer    Primary malignant neoplasm of lung (Multi) 2012    Pulmonary embolism (Multi)     Rash and other nonspecific skin eruption 2022    Sensorineural hearing loss (SNHL) of both ears 2023    Squamous cell carcinoma lung, right (Multi) 2012    Stage 3 chronic kidney disease (Multi) 2023    Stage 3a chronic kidney disease (CKD) (Multi) 2023    Wax in ear 2024     Past Surgical History:   Procedure Laterality Date    CARDIAC CATHETERIZATION      COLONOSCOPY      CT GUIDED IMAGING FOR NEEDLE PLACEMENT  2012    CT GUIDED IMAGING FOR NEEDLE PLACEMENT [facility]    OTHER SURGICAL HISTORY  2022    Hip replacement    TOTAL HIP ARTHROPLASTY Left      Family History   Problem Relation Name Age of Onset    Hypothyroidism Daughter       Social History     Socioeconomic History    Marital status:      Spouse name: Not on file    Number of children: Not on file    Years of education: Not on file    Highest education level: Not on file   Occupational History    Not on file   Tobacco Use    Smoking status: Former     Current packs/day: 0.00     Types: Cigarettes     Quit date:      Years since quittin.5     Passive exposure: Never    Smokeless tobacco: Never   Vaping Use    Vaping status: Never Used   Substance and Sexual Activity    Alcohol use: Yes     Alcohol/week: 6.0 standard drinks of alcohol     Types: 6 Standard drinks or equivalent per week    Drug use: Never    Sexual activity: Never   Other Topics Concern    Not on file   Social History Narrative    Not on file     Social Determinants of Health     Financial Resource Strain: Not on file   Food Insecurity: Not on file   Transportation Needs: Not on file   Physical Activity: Not on file   Stress: Not on file   Social Connections: Not on file   Intimate Partner Violence: Not on file   Housing Stability: Not on file     No Known Allergies  Current Outpatient  Medications on File Prior to Visit   Medication Sig Dispense Refill    allopurinol (Zyloprim) 100 mg tablet Take 1 tablet (100 mg) by mouth once daily. 90 tablet 3    aspirin 81 mg EC tablet Take 1 tablet (81 mg) by mouth once daily.      levothyroxine (Synthroid, Levoxyl) 50 mcg tablet Take 1 tablet (50 mcg) by mouth once daily.      multivitamin with minerals (multivitamin with folic acid) tablet Take 1 tablet by mouth once daily.  Additional Instructions: SUPPLEMENT      omeprazole (PriLOSEC) 40 mg DR capsule Take 1 capsule (40 mg) by mouth once daily.  Additional Instructions: REFLUX      oxybutynin XL (Ditropan-XL) 10 mg 24 hr tablet Take 1 tablet (10 mg) by mouth once daily.      torsemide (Demadex) 5 mg tablet Take 1 tablet (5 mg) by mouth once daily.      valsartan (Diovan) 80 mg tablet Take 1 tablet (80 mg) by mouth once daily.      [DISCONTINUED] venlafaxine XR (Effexor-XR) 37.5 mg 24 hr capsule Take 1 capsule (37.5 mg) by mouth once daily. With food       No current facility-administered medications on file prior to visit.     Immunization History   Administered Date(s) Administered    Flu vaccine, quadrivalent, high-dose, preservative free, age 65y+ (FLUZONE) 09/13/2020, 10/01/2020, 09/25/2021, 10/10/2022, 09/26/2023    Influenza, High Dose Seasonal, Preservative Free 10/02/2017, 10/02/2018, 10/04/2019    Influenza, injectable, quadrivalent 09/23/2016    Influenza, seasonal, injectable 11/02/2013, 10/01/2014, 10/06/2015    Moderna COVID-19 vaccine, Fall 2023, 12 yeasrs and older (50mcg/0.5mL) 10/29/2023    Moderna COVID-19 vaccine, bivalent, blue cap/gray label *Check age/dose* 12/06/2022, 06/16/2023    Moderna SARS-CoV-2 Vaccination 01/15/2021, 02/01/2021, 02/16/2021, 03/02/2021, 11/04/2021, 08/16/2022    Pneumococcal conjugate vaccine, 13-valent (PREVNAR 13) 05/08/2015    Pneumococcal polysaccharide vaccine, 23-valent, age 2 years and older (PNEUMOVAX 23) 05/02/2014, 11/06/2020    RSV, 60 Years And  Older (AREXVY) 10/29/2023    Tdap vaccine, age 7 year and older (BOOSTRIX, ADACEL) 10/04/2017    Zoster vaccine, recombinant, adult (SHINGRIX) 01/05/2018, 04/08/2018, 07/20/2018, 05/01/2019    Zoster, live 01/02/2012     Patient's medical history was reviewed and updated either before or during this encounter.       Bogdan Busby MD

## 2024-07-08 ENCOUNTER — OFFICE VISIT (OUTPATIENT)
Dept: PRIMARY CARE | Facility: CLINIC | Age: 84
End: 2024-07-08
Payer: MEDICARE

## 2024-07-08 VITALS
HEIGHT: 70 IN | OXYGEN SATURATION: 95 % | HEART RATE: 89 BPM | WEIGHT: 222 LBS | BODY MASS INDEX: 31.78 KG/M2 | SYSTOLIC BLOOD PRESSURE: 118 MMHG | TEMPERATURE: 97.5 F | DIASTOLIC BLOOD PRESSURE: 74 MMHG

## 2024-07-08 DIAGNOSIS — E78.2 MIXED HYPERLIPIDEMIA: ICD-10-CM

## 2024-07-08 DIAGNOSIS — I50.32 CHRONIC DIASTOLIC CONGESTIVE HEART FAILURE (MULTI): ICD-10-CM

## 2024-07-08 DIAGNOSIS — I25.118 ATHEROSCLEROTIC HEART DISEASE OF NATIVE CORONARY ARTERY WITH OTHER FORMS OF ANGINA PECTORIS (CMS-HCC): ICD-10-CM

## 2024-07-08 DIAGNOSIS — N18.31 STAGE 3A CHRONIC KIDNEY DISEASE (CKD) (MULTI): ICD-10-CM

## 2024-07-08 DIAGNOSIS — I10 ESSENTIAL HYPERTENSION: Primary | ICD-10-CM

## 2024-07-08 DIAGNOSIS — K21.9 GASTROESOPHAGEAL REFLUX DISEASE WITHOUT ESOPHAGITIS: ICD-10-CM

## 2024-07-08 DIAGNOSIS — E03.9 ACQUIRED HYPOTHYROIDISM: ICD-10-CM

## 2024-07-08 DIAGNOSIS — N32.81 OVERACTIVE BLADDER: ICD-10-CM

## 2024-07-08 PROCEDURE — 3074F SYST BP LT 130 MM HG: CPT | Performed by: INTERNAL MEDICINE

## 2024-07-08 PROCEDURE — 1159F MED LIST DOCD IN RCRD: CPT | Performed by: INTERNAL MEDICINE

## 2024-07-08 PROCEDURE — 1125F AMNT PAIN NOTED PAIN PRSNT: CPT | Performed by: INTERNAL MEDICINE

## 2024-07-08 PROCEDURE — 99213 OFFICE O/P EST LOW 20 MIN: CPT | Performed by: INTERNAL MEDICINE

## 2024-07-08 PROCEDURE — 1160F RVW MEDS BY RX/DR IN RCRD: CPT | Performed by: INTERNAL MEDICINE

## 2024-07-08 PROCEDURE — G2211 COMPLEX E/M VISIT ADD ON: HCPCS | Performed by: INTERNAL MEDICINE

## 2024-07-08 PROCEDURE — 3078F DIAST BP <80 MM HG: CPT | Performed by: INTERNAL MEDICINE

## 2024-07-08 RX ORDER — TORSEMIDE 5 MG/1
1 TABLET ORAL DAILY
COMMUNITY
Start: 2024-06-03

## 2024-07-08 ASSESSMENT — ENCOUNTER SYMPTOMS
GASTROINTESTINAL NEGATIVE: 1
CARDIOVASCULAR NEGATIVE: 1
LOSS OF SENSATION IN FEET: 0
OCCASIONAL FEELINGS OF UNSTEADINESS: 1
SHORTNESS OF BREATH: 1
DEPRESSION: 0

## 2024-07-08 ASSESSMENT — PATIENT HEALTH QUESTIONNAIRE - PHQ9
1. LITTLE INTEREST OR PLEASURE IN DOING THINGS: NOT AT ALL
2. FEELING DOWN, DEPRESSED OR HOPELESS: NOT AT ALL
SUM OF ALL RESPONSES TO PHQ9 QUESTIONS 1 AND 2: 0

## 2024-07-08 ASSESSMENT — PAIN SCALES - GENERAL: PAINLEVEL: 5

## 2024-07-08 NOTE — PATIENT INSTRUCTIONS
I think really look pretty good right now.  I think we had a pretty good conversation regarding diastolic heart failure and I think that your current mild leg swelling is not related to heart failure related to venous insufficiency which you might categorizes pooling of blood in the legs.  If we talked about before avoid salt elevate the legs and you could consider support stockings if it gets bad enough.    Lets leave your medications the same.  You may stay off the Crestor for now and we will reassess your cholesterol panel and how you are feeling once you get over this during bursitis.    I look forward to seeing you in 3 months unless you need me sooner

## 2024-07-23 ENCOUNTER — DOCUMENTATION (OUTPATIENT)
Dept: PRIMARY CARE | Facility: CLINIC | Age: 84
End: 2024-07-23
Payer: MEDICARE

## 2024-07-23 ENCOUNTER — PATIENT OUTREACH (OUTPATIENT)
Dept: PRIMARY CARE | Facility: CLINIC | Age: 84
End: 2024-07-23
Payer: MEDICARE

## 2024-07-24 ENCOUNTER — PATIENT OUTREACH (OUTPATIENT)
Dept: PRIMARY CARE | Facility: CLINIC | Age: 84
End: 2024-07-24
Payer: MEDICARE

## 2024-07-24 ENCOUNTER — DOCUMENTATION (OUTPATIENT)
Dept: PRIMARY CARE | Facility: CLINIC | Age: 84
End: 2024-07-24
Payer: MEDICARE

## 2024-07-24 NOTE — PROGRESS NOTES
I spoke with pt about his situation - went to [facility] for acute pleuritic chest apin - dx with PE - given xarelto and asked to followup ;  He hasn't yet taken.    I reviewed the CT scan - there is report of a chronic thrombus but nothing acute - however - history of PE in a paitent with a history of lung ca may warrant long term anticoag - I suggested he actually take the xarelto and we will talk it over with his hematologist at upcoming aug 9 appt - he knows risks/benefits of blood thinners .

## 2024-07-24 NOTE — PROGRESS NOTES
Discharge Facility: [facility]  Discharge Diagnosis: PE  Admission Date: 21 July 24  Discharge Date: 22 July 24    PCP Appointment Date: 26 July 24  Specialist Appointment Date: 1 Aug 24 ([facility], [place])  Hospital Encounter and Summary Linked: Yes    See discharge assessment below for further details     Engagement  Call Start Time: 0851 (7/24/2024  9:01 AM)    Medications  Medications reviewed with patient/caregiver?: Yes (7/24/2024  9:01 AM)  Is the patient having any side effects they believe may be caused by any medication additions or changes?: No (7/24/2024  9:01 AM)  Does the patient have all medications ordered at discharge?: Yes (7/24/2024  9:01 AM)  Prescription Comments: None (7/24/2024  9:01 AM)  Is the patient taking all medications as directed (includes completed medication regime)?: Yes (7/24/2024  9:01 AM)  Medication Comments: None (7/24/2024  9:01 AM)    Appointments  Does the patient have a primary care provider?: Yes (follow up made by me for 26 July 24) (7/24/2024  9:01 AM)  Care Management Interventions: Verified appointment date/time/provider (7/24/2024  9:01 AM)  Has the patient kept scheduled appointments due by today?: Yes (7/24/2024  9:01 AM)  Care Management Interventions: Advised patient to keep appointment (7/24/2024  9:01 AM)    Self Management  What is the home health agency?: N/A (7/24/2024  9:01 AM)  Has home health visited the patient within 72 hours of discharge?: Not applicable (7/24/2024  9:01 AM)  What Durable Medical Equipment (DME) was ordered?: N/A (7/24/2024  9:01 AM)    Patient Teaching  Does the patient have access to their discharge instructions?: Yes (7/24/2024  9:01 AM)  Care Management Interventions: Reviewed instructions with patient (7/24/2024  9:01 AM)  What is the patient's perception of their health status since discharge?: Improving (7/24/2024  9:01 AM)  Is the patient/caregiver able to teach back the hierarchy of who to call/visit for  symptoms/problems? PCP, Specialist, Home Health nurse, Urgent Care, ED, 911: Yes (7/24/2024  9:01 AM)  Patient/Caregiver Education Comments: none (7/24/2024  9:01 AM)    Wrap Up  Wrap Up Additional Comments: None (7/24/2024  9:01 AM)  Call End Time: 0901 (7/24/2024  9:01 AM)    Pt grateful for outreach call and is agreeable to being contacted after PCP appt to see how they are doing.

## 2024-07-26 ENCOUNTER — APPOINTMENT (OUTPATIENT)
Dept: PRIMARY CARE | Facility: CLINIC | Age: 84
End: 2024-07-26
Payer: MEDICARE

## 2024-07-30 NOTE — PROGRESS NOTES
[facility]: [facility]  PROGRESS NOTE      Joseph Hicks is a 83 y.o. male that is presenting today for Follow-up (TCM fu).    Assessment/Plan   Diagnoses and all orders for this visit:  Pulmonary embolism without acute cor pulmonale, unspecified chronicity, unspecified pulmonary embolism type (Multi)  Essential hypertension  Chronic diastolic congestive heart failure (Multi)  Gastroesophageal reflux disease without esophagitis  BPH without urinary obstruction  Atherosclerotic heart disease of native coronary artery with other forms of angina pectoris (CMS-HCC)  Chronic gout without tophus, unspecified cause, unspecified site  Acquired hypothyroidism  Mixed hyperlipidemia  Neuropathy  Overactive bladder  Stage 3a chronic kidney disease (CKD) (Multi)  We reviewed the situation together today.  We also reviewed the CAT scan report.  He had a pulmonary embolism in the left lung fields.  Interestingly the radiologist comments that it looks somewhat subacute or chronic.  Yet his clinical picture really looks like this was an acute PE.    Furthermore concerned about the fact that this would be his second clotting episode.  He had 12 years ago in the midst of his treatment for his lung cancer and episode of DVT for which she had been anticoagulated.  It would seem less likely that he will need to have long-term anticoagulants.  We discussed the risks and benefits of these medicines together and he understands that right now.    He has an appointment coming up with his oncologist Dr. Olsen on August 9.  They will discuss the situation at that time.  For the moment however I would continue of course with the anticoagulants and he can keep his routine follow-up with me.  Kika Peña sees me for follow-up.  He had a sudden onset of shortness of breath and pleuritic chest pain on the left-hand side on July 21 and had gone to the [facility] and mentor.  There they had  done a CT scan of his chest which had revealed a pulmonary embolism.  They sent him home anticoagulants however he had not started it right away we talked subsequently and he did in fact start the medication which she has been tolerating well since then he is not complaining of any chest pain or shortness of breath.  He is not having any hemoptysis right now.      Review of Systems   Respiratory: Negative.     Cardiovascular: Negative.    Gastrointestinal: Negative.    Genitourinary: Negative.       Objective   Vitals:    07/31/24 0920   BP: 120/73   Pulse: 79   Temp: 36.6 °C (97.8 °F)   SpO2: 95%      Body mass index is 30.71 kg/m².  Physical Exam  Cardiovascular:      Rate and Rhythm: Normal rate and regular rhythm.      Pulses: Normal pulses.      Heart sounds: Normal heart sounds.   Pulmonary:      Effort: Pulmonary effort is normal.      Breath sounds: Normal breath sounds.   Abdominal:      General: Abdomen is flat. Bowel sounds are normal.      Palpations: Abdomen is soft.   Musculoskeletal:      Cervical back: Normal range of motion and neck supple.      Right lower leg: Edema present.      Left lower leg: Edema present.      Comments: Chronic stasis changes bilaterall       Diagnostic Results   Lab Results   Component Value Date    GLUCOSE 94 05/09/2024    CALCIUM 10.0 05/09/2024     05/09/2024    K 4.7 05/09/2024    CO2 28 05/09/2024     05/09/2024    BUN 26 (H) 05/09/2024    CREATININE 1.40 05/09/2024     Lab Results   Component Value Date    ALT 19 05/09/2024    AST 25 05/09/2024    ALKPHOS 103 05/09/2024    BILITOT 0.7 05/09/2024     Lab Results   Component Value Date    WBC 6.1 05/09/2024    HGB 16.3 05/09/2024    HCT 50.4 05/09/2024     (H) 05/09/2024     05/09/2024     Lab Results   Component Value Date    CHOL 165 05/09/2024    CHOL 152 10/27/2023    CHOL 153 03/16/2023     Lab Results   Component Value Date    HDL 73.0 05/09/2024    HDL 70.0 10/27/2023    HDL 68 03/16/2023  "    Lab Results   Component Value Date    LDLCALC 64 (L) 05/09/2024    LDLCALC 62 (L) 10/27/2023    LDLCALC 65 03/16/2023     Lab Results   Component Value Date    TRIG 142 05/09/2024    TRIG 99 10/27/2023    TRIG 102 03/16/2023     No components found for: \"CHOLHDL\"  Lab Results   Component Value Date    HGBA1C 5.7 05/27/2022     Other labs not included in the list above were reviewed either before or during this encounter.    History    Past Medical History:   Diagnosis Date    Airway clearance impairment 08/26/2023    Angina pectoris (CMS-HCC) 08/26/2023    Arthritis of hip 08/26/2023    Arthritis of right hip 08/26/2023    At risk for bleeding 08/26/2023    Atherosclerotic heart disease of native coronary artery with other forms of angina pectoris (CMS-HCC) 03/18/2019    Benign prostatic hyperplasia without urinary obstruction 03/18/2019    Bilateral sensorineural hearing loss 08/26/2023    Chronic rhinitis 05/06/2024    Congestive heart failure (Multi) 11/19/2023    Coronary arteriosclerosis 03/18/2019    Depression 08/26/2023    Depressive disorder 03/18/2019    Essential hypertension 08/26/2023    External hemorrhoids 08/26/2023    Gastroesophageal reflux disease 08/26/2023    Gout 08/26/2023    History of lung cancer 10/26/2023    History of malignant neoplasm 05/06/2024    History of thromboembolism of vein 08/26/2023    History of venous thromboembolism 08/26/2023    Hypertension 09/13/2022    Hypothyroidism 08/26/2023    Impaired gas exchange 08/26/2023    Kidney stone 08/26/2023    Lung cancer (Multi) 2012    nonsmall cell    Malignant neoplasm of lung (Multi) 03/18/2019    chemo and radiation, remission for 6 years    Mixed hyperlipidemia 08/26/2023    Neuropathy 08/26/2023    Overactive bladder 08/26/2023    Personal history of malignant neoplasm, unspecified     History of cancer    Primary malignant neoplasm of lung (Multi) 09/20/2012    Pulmonary embolism (Multi) 2012    Pulmonary embolism (Multi) " 2024    Rash and other nonspecific skin eruption 2022    Sensorineural hearing loss (SNHL) of both ears 2023    Squamous cell carcinoma lung, right (Multi) 2012    Stage 3 chronic kidney disease (Multi) 2023    Stage 3a chronic kidney disease (CKD) (Multi) 2023    Wax in ear 2024     Past Surgical History:   Procedure Laterality Date    CARDIAC CATHETERIZATION      COLONOSCOPY      CT GUIDED IMAGING FOR NEEDLE PLACEMENT  2012    CT GUIDED IMAGING FOR NEEDLE PLACEMENT [facility]    OTHER SURGICAL HISTORY  2022    Hip replacement    TOTAL HIP ARTHROPLASTY Left      Family History   Problem Relation Name Age of Onset    Hypothyroidism Daughter       Social History     Socioeconomic History    Marital status:      Spouse name: Not on file    Number of children: Not on file    Years of education: Not on file    Highest education level: Not on file   Occupational History    Not on file   Tobacco Use    Smoking status: Former     Current packs/day: 0.00     Types: Cigarettes     Quit date:      Years since quittin.5     Passive exposure: Past    Smokeless tobacco: Never   Vaping Use    Vaping status: Never Used   Substance and Sexual Activity    Alcohol use: Yes     Alcohol/week: 6.0 standard drinks of alcohol     Types: 6 Standard drinks or equivalent per week    Drug use: Never    Sexual activity: Never   Other Topics Concern    Not on file   Social History Narrative    Not on file     Social Determinants of Health     Financial Resource Strain: Not on file   Food Insecurity: Not on file   Transportation Needs: Not on file   Physical Activity: Not on file   Stress: Not on file   Social Connections: Not on file   Intimate Partner Violence: Not on file   Housing Stability: Not on file     No Known Allergies  Current Outpatient Medications on File Prior to Visit   Medication Sig Dispense Refill    allopurinol (Zyloprim) 100 mg tablet Take 1  tablet (100 mg) by mouth once daily. 90 tablet 3    aspirin 81 mg EC tablet Take 1 tablet (81 mg) by mouth once daily.      levothyroxine (Synthroid, Levoxyl) 50 mcg tablet Take 1 tablet (50 mcg) by mouth once daily.      multivitamin with minerals (multivitamin with folic acid) tablet Take 1 tablet by mouth once daily.  Additional Instructions: SUPPLEMENT      omeprazole (PriLOSEC) 40 mg DR capsule Take 1 capsule (40 mg) by mouth once daily.  Additional Instructions: REFLUX      oxybutynin XL (Ditropan-XL) 10 mg 24 hr tablet Take 1 tablet (10 mg) by mouth once daily.      torsemide (Demadex) 5 mg tablet Take 1 tablet (5 mg) by mouth once daily.      valsartan (Diovan) 80 mg tablet Take 1 tablet (80 mg) by mouth once daily.      Xarelto 15 mg tablet TAKE 1 TABLET BY MOUTH DAILY WITH DINNER FOR 21 DAYS      [DISCONTINUED] venlafaxine XR (Effexor-XR) 37.5 mg 24 hr capsule Take 1 capsule (37.5 mg) by mouth once daily.       No current facility-administered medications on file prior to visit.     Immunization History   Administered Date(s) Administered    Flu vaccine, quadrivalent, high-dose, preservative free, age 65y+ (FLUZONE) 09/13/2020, 10/01/2020, 09/25/2021, 10/10/2022, 09/26/2023    Influenza, High Dose Seasonal, Preservative Free 10/02/2017, 10/02/2018, 10/04/2019    Influenza, injectable, quadrivalent 09/23/2016    Influenza, seasonal, injectable 11/02/2013, 10/01/2014, 10/06/2015    Moderna COVID-19 vaccine, Fall 2023, 12 yeasrs and older (50mcg/0.5mL) 10/29/2023    Moderna COVID-19 vaccine, bivalent, blue cap/gray label *Check age/dose* 12/06/2022, 06/16/2023    Moderna SARS-CoV-2 Vaccination 01/15/2021, 02/01/2021, 02/16/2021, 03/02/2021, 11/04/2021, 08/16/2022    Pneumococcal conjugate vaccine, 13-valent (PREVNAR 13) 05/08/2015    Pneumococcal polysaccharide vaccine, 23-valent, age 2 years and older (PNEUMOVAX 23) 05/02/2014, 11/06/2020    RSV, 60 Years And Older (AREXVY) 10/29/2023    Tdap vaccine, age 7  year and older (BOOSTRIX, ADACEL) 10/04/2017    Zoster vaccine, recombinant, adult (SHINGRIX) 01/05/2018, 04/08/2018, 07/20/2018, 05/01/2019    Zoster, live 01/02/2012     Patient's medical history was reviewed and updated either before or during this encounter.       Bogdan Busby MD

## 2024-07-31 ENCOUNTER — OFFICE VISIT (OUTPATIENT)
Dept: PRIMARY CARE | Facility: CLINIC | Age: 84
End: 2024-07-31
Payer: MEDICARE

## 2024-07-31 VITALS
DIASTOLIC BLOOD PRESSURE: 73 MMHG | HEART RATE: 79 BPM | TEMPERATURE: 97.8 F | SYSTOLIC BLOOD PRESSURE: 120 MMHG | HEIGHT: 70 IN | OXYGEN SATURATION: 95 % | BODY MASS INDEX: 30.64 KG/M2 | WEIGHT: 214 LBS

## 2024-07-31 DIAGNOSIS — N32.81 OVERACTIVE BLADDER: ICD-10-CM

## 2024-07-31 DIAGNOSIS — N40.0 BPH WITHOUT URINARY OBSTRUCTION: ICD-10-CM

## 2024-07-31 DIAGNOSIS — I10 ESSENTIAL HYPERTENSION: ICD-10-CM

## 2024-07-31 DIAGNOSIS — K21.9 GASTROESOPHAGEAL REFLUX DISEASE WITHOUT ESOPHAGITIS: ICD-10-CM

## 2024-07-31 DIAGNOSIS — N18.31 STAGE 3A CHRONIC KIDNEY DISEASE (CKD) (MULTI): ICD-10-CM

## 2024-07-31 DIAGNOSIS — E78.2 MIXED HYPERLIPIDEMIA: ICD-10-CM

## 2024-07-31 DIAGNOSIS — I26.99 PULMONARY EMBOLISM WITHOUT ACUTE COR PULMONALE, UNSPECIFIED CHRONICITY, UNSPECIFIED PULMONARY EMBOLISM TYPE (MULTI): Primary | ICD-10-CM

## 2024-07-31 DIAGNOSIS — I50.32 CHRONIC DIASTOLIC CONGESTIVE HEART FAILURE (MULTI): ICD-10-CM

## 2024-07-31 DIAGNOSIS — G62.9 NEUROPATHY: ICD-10-CM

## 2024-07-31 DIAGNOSIS — E03.9 ACQUIRED HYPOTHYROIDISM: ICD-10-CM

## 2024-07-31 DIAGNOSIS — M1A.9XX0 CHRONIC GOUT WITHOUT TOPHUS, UNSPECIFIED CAUSE, UNSPECIFIED SITE: ICD-10-CM

## 2024-07-31 DIAGNOSIS — I25.118 ATHEROSCLEROTIC HEART DISEASE OF NATIVE CORONARY ARTERY WITH OTHER FORMS OF ANGINA PECTORIS (CMS-HCC): ICD-10-CM

## 2024-07-31 PROBLEM — C34.90 PRIMARY MALIGNANT NEOPLASM OF LUNG (MULTI): Status: ACTIVE | Noted: 2022-09-13

## 2024-07-31 PROBLEM — E66.9 OBESITY, CLASS I, BMI 30-34.9: Status: ACTIVE | Noted: 2024-07-22

## 2024-07-31 PROBLEM — J15.9 COMMUNITY ACQUIRED BACTERIAL PNEUMONIA: Status: ACTIVE | Noted: 2024-07-21

## 2024-07-31 PROBLEM — R09.1 PLEURISY: Status: ACTIVE | Noted: 2024-07-21

## 2024-07-31 PROBLEM — H90.3 SENSORINEURAL HEARING LOSS (SNHL) OF BOTH EARS: Status: ACTIVE | Noted: 2024-07-31

## 2024-07-31 PROBLEM — E87.70 HYPERVOLEMIA: Status: ACTIVE | Noted: 2024-07-23

## 2024-07-31 PROBLEM — Z86.718 HISTORY OF THROMBOEMBOLISM OF VEIN: Status: ACTIVE | Noted: 2024-07-31

## 2024-07-31 PROBLEM — M16.10 ARTHRITIS OF HIP: Status: ACTIVE | Noted: 2024-07-31

## 2024-07-31 PROBLEM — F32.A DEPRESSIVE DISORDER: Status: ACTIVE | Noted: 2024-07-31

## 2024-07-31 PROBLEM — J90 PLEURAL EFFUSION ON LEFT: Status: ACTIVE | Noted: 2024-07-22

## 2024-07-31 PROBLEM — E66.811 OBESITY, CLASS I, BMI 30-34.9: Status: ACTIVE | Noted: 2024-07-22

## 2024-07-31 PROCEDURE — 1160F RVW MEDS BY RX/DR IN RCRD: CPT | Performed by: INTERNAL MEDICINE

## 2024-07-31 PROCEDURE — 1159F MED LIST DOCD IN RCRD: CPT | Performed by: INTERNAL MEDICINE

## 2024-07-31 PROCEDURE — 3078F DIAST BP <80 MM HG: CPT | Performed by: INTERNAL MEDICINE

## 2024-07-31 PROCEDURE — 1125F AMNT PAIN NOTED PAIN PRSNT: CPT | Performed by: INTERNAL MEDICINE

## 2024-07-31 PROCEDURE — 3074F SYST BP LT 130 MM HG: CPT | Performed by: INTERNAL MEDICINE

## 2024-07-31 PROCEDURE — G2211 COMPLEX E/M VISIT ADD ON: HCPCS | Performed by: INTERNAL MEDICINE

## 2024-07-31 PROCEDURE — 99214 OFFICE O/P EST MOD 30 MIN: CPT | Performed by: INTERNAL MEDICINE

## 2024-07-31 PROCEDURE — 1036F TOBACCO NON-USER: CPT | Performed by: INTERNAL MEDICINE

## 2024-07-31 RX ORDER — VENLAFAXINE HYDROCHLORIDE 37.5 MG/1
37.5 CAPSULE, EXTENDED RELEASE ORAL
COMMUNITY
Start: 2024-07-23 | End: 2024-07-31 | Stop reason: ALTCHOICE

## 2024-07-31 RX ORDER — RIVAROXABAN 15 MG/1
TABLET, FILM COATED ORAL
COMMUNITY

## 2024-07-31 ASSESSMENT — ENCOUNTER SYMPTOMS
RESPIRATORY NEGATIVE: 1
CARDIOVASCULAR NEGATIVE: 1
GASTROINTESTINAL NEGATIVE: 1

## 2024-07-31 ASSESSMENT — PAIN SCALES - GENERAL: PAINLEVEL: 3

## 2024-07-31 NOTE — PATIENT INSTRUCTIONS
Casey it was good to see you today.  Please be sure to stay on the Xarelto as you are currently taking it.  And keep your follow-up appointment with your oncologist/hematologist to further discuss this.  Otherwise you keep your regular follow-up appointment with me.    As you know and we discussed the dosing of the Xarelto.  The paperwork that you had initially sent me would have been the proper dosage.  You should be on Xarelto 15 mg twice a day for the first 21 days and then you switch over to 20 mg daily for the long-term.  Please be sure this is what you actually have because I have some confusion now based on your conversation about the prescription that they actually gave you.

## 2024-08-01 ENCOUNTER — PATIENT OUTREACH (OUTPATIENT)
Dept: PRIMARY CARE | Facility: CLINIC | Age: 84
End: 2024-08-01
Payer: MEDICARE

## 2024-08-01 ENCOUNTER — APPOINTMENT (OUTPATIENT)
Dept: ORTHOPEDIC SURGERY | Facility: CLINIC | Age: 84
End: 2024-08-01
Payer: MEDICARE

## 2024-08-01 DIAGNOSIS — I26.99 PULMONARY EMBOLISM WITHOUT ACUTE COR PULMONALE, UNSPECIFIED CHRONICITY, UNSPECIFIED PULMONARY EMBOLISM TYPE (MULTI): Primary | ICD-10-CM

## 2024-08-01 NOTE — PROGRESS NOTES
Call regarding appt. with PCP on (31 July 24) after hospitalization.  At time of outreach call the patient feels as if their condition has improved since last visit.  Reviewed the PCP appointment with the pt and addressed any questions or concerns. Referral requested to Pharm sent to PCP to help clarify medication question that patient had.

## 2024-08-12 ENCOUNTER — TELEPHONE (OUTPATIENT)
Dept: PRIMARY CARE | Facility: CLINIC | Age: 84
End: 2024-08-12
Payer: MEDICARE

## 2024-08-12 NOTE — TELEPHONE ENCOUNTER
Called patient to discuss Xarelto dosing. Recently had an appointment with his oncologist at [facility] who prescribed Xarelto 20 mg every day for the patient to start after he finished the 15 mg BID. Patient was confused about dosing but we talked through it and he stated understanding. Will call the office if he has any additional questions

## 2024-08-20 ENCOUNTER — PATIENT OUTREACH (OUTPATIENT)
Dept: PRIMARY CARE | Facility: CLINIC | Age: 84
End: 2024-08-20
Payer: MEDICARE

## 2024-09-04 ENCOUNTER — APPOINTMENT (OUTPATIENT)
Dept: OTOLARYNGOLOGY | Facility: CLINIC | Age: 84
End: 2024-09-04
Payer: MEDICARE

## 2024-09-23 ENCOUNTER — OFFICE VISIT (OUTPATIENT)
Dept: OTOLARYNGOLOGY | Facility: CLINIC | Age: 84
End: 2024-09-23
Payer: MEDICARE

## 2024-09-23 VITALS — WEIGHT: 215 LBS | HEIGHT: 70 IN | BODY MASS INDEX: 30.78 KG/M2

## 2024-09-23 DIAGNOSIS — H61.20 CERUMEN IN AUDITORY CANAL ON EXAMINATION: Primary | ICD-10-CM

## 2024-09-23 DIAGNOSIS — K21.9 LARYNGOPHARYNGEAL REFLUX: ICD-10-CM

## 2024-09-23 PROCEDURE — 1036F TOBACCO NON-USER: CPT | Performed by: OTOLARYNGOLOGY

## 2024-09-23 PROCEDURE — 1160F RVW MEDS BY RX/DR IN RCRD: CPT | Performed by: OTOLARYNGOLOGY

## 2024-09-23 PROCEDURE — 1159F MED LIST DOCD IN RCRD: CPT | Performed by: OTOLARYNGOLOGY

## 2024-09-23 PROCEDURE — 99213 OFFICE O/P EST LOW 20 MIN: CPT | Performed by: OTOLARYNGOLOGY

## 2024-09-23 NOTE — PROGRESS NOTES
HPI  Casey Hicks is a 83 y.o. male here for routine right-sided cerumen management.  Wears hearing aids bilaterally and does well with them.  History of reflux and takes antiacid with good effect.  Call us for refills when necessary.  History of sensorineural hearing loss from treatment of lung cancer and chemotherapy around 11 years ago.      Past Medical History:   Diagnosis Date    Airway clearance impairment 08/26/2023    Angina pectoris (CMS-HCC) 08/26/2023    Arthritis of hip 08/26/2023    Arthritis of right hip 08/26/2023    At risk for bleeding 08/26/2023    Atherosclerotic heart disease of native coronary artery with other forms of angina pectoris (CMS-HCC) 03/18/2019    Benign prostatic hyperplasia without urinary obstruction 03/18/2019    Bilateral sensorineural hearing loss 08/26/2023    Chronic rhinitis 05/06/2024    Congestive heart failure (Multi) 11/19/2023    Coronary arteriosclerosis 03/18/2019    Depression 08/26/2023    Depressive disorder 03/18/2019    Essential hypertension 08/26/2023    External hemorrhoids 08/26/2023    Gastroesophageal reflux disease 08/26/2023    Gout 08/26/2023    History of lung cancer 10/26/2023    History of malignant neoplasm 05/06/2024    History of thromboembolism of vein 08/26/2023    History of venous thromboembolism 08/26/2023    Hypertension 09/13/2022    Hypothyroidism 08/26/2023    Impaired gas exchange 08/26/2023    Kidney stone 08/26/2023    Lung cancer (Multi) 2012    nonsmall cell    Malignant neoplasm of lung (Multi) 03/18/2019    chemo and radiation, remission for 6 years    Mixed hyperlipidemia 08/26/2023    Neuropathy 08/26/2023    Overactive bladder 08/26/2023    Personal history of malignant neoplasm, unspecified     History of cancer    Primary malignant neoplasm of lung (Multi) 09/20/2012    Pulmonary embolism (Multi) 2012    Pulmonary embolism (Multi) 07/2024    Rash and other nonspecific skin eruption 09/13/2022    Sensorineural hearing  "loss (SNHL) of both ears 08/26/2023    Squamous cell carcinoma lung, 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    Stage 3 chronic kidney disease (Multi) 11/19/2023    Stage 3a chronic kidney disease (CKD) (Multi) 11/19/2023    Wax in ear 05/06/2024            Medications:     Current Outpatient Medications:     allopurinol (Zyloprim) 100 mg tablet, Take 1 tablet (100 mg) by mouth once daily., Disp: 90 tablet, Rfl: 3    aspirin 81 mg EC tablet, Take 1 tablet (81 mg) by mouth once daily., Disp: , Rfl:     levothyroxine (Synthroid, Levoxyl) 50 mcg tablet, Take 1 tablet (50 mcg) by mouth once daily., Disp: , Rfl:     multivitamin with minerals (multivitamin with folic acid) tablet, Take 1 tablet by mouth once daily.  Additional Instructions: SUPPLEMENT, Disp: , Rfl:     omeprazole (PriLOSEC) 40 mg DR capsule, Take 1 capsule (40 mg) by mouth once daily.  Additional Instructions: REFLUX, Disp: , Rfl:     oxybutynin XL (Ditropan-XL) 10 mg 24 hr tablet, Take 1 tablet (10 mg) by mouth once daily., Disp: , Rfl:     torsemide (Demadex) 5 mg tablet, Take 1 tablet (5 mg) by mouth once daily., Disp: , Rfl:     valsartan (Diovan) 80 mg tablet, Take 1 tablet (80 mg) by mouth once daily., Disp: , Rfl:     Xarelto 15 mg tablet, TAKE 1 TABLET BY MOUTH DAILY WITH DINNER FOR 21 DAYS, Disp: , Rfl:     rivaroxaban (Xarelto) 15 mg tablet, Take 1 tablet (15 mg) by mouth 2 times a day for 14 days. Take with food., Disp: 28 tablet, Rfl: 0     Allergies:  No Known Allergies     Physical Exam:  Last Recorded Vitals  Height 1.778 m (5' 10\"), weight 97.5 kg (215 lb).  General:     General appearance: Well-developed, well-nourished in no acute distress.       Voice:  normal       Head/face: Normal appearance; nontender to palpation     Facial nerve function: Normal and symmetric bilaterally.    Oral/oropharynx:     Oral vestibule: Normal labial and gingival mucosa     Tongue/floor of mouth: Normal without lesion     Oropharynx: Clear.  No lesions present of the " hard/soft palate, posterior pharynx    Neck:     Neck: Normal appearance, trachea midline     Salivary glands: Normal to palpation bilaterally     Lymph nodes: No cervical lymphadenopathy to palpation     Thyroid: No thyromegaly.  No palpable nodules     Range of motion: Normal    Neurological:     Cortical functions: Alert and oriented x3, appropriate affect       Larynx/hypopharynx:     Laryngeal findings: Mirror exam inadequate or limited secondary to enlarged base of tongue and/or excessive gagging    Ear:     Ear canal: Normal bilaterally after suctioning dense cerumen impaction on the right and minimal cerumen on the left     Tympanic membrane: Intact and mobile bilaterally     Pinna: Normal bilaterally     Hearing:  Gross hearing assessment normal by voice    Nose:     Visualized using: Anterior rhinoscopy     Nasopharynx: Inadequate mirror exam secondary to gag, anatomy.       Nasal dorsum: Nontraumatic midline appearance     Septum: Midline     Inferior turbinates: Normally sized     Mucosa: Bilateral, pink, normal appearing       Assessment/Plan   Continue antacid.  Cleaned bilaterally, right greater than left. we will see him back in 6 months and sooner as needed         Alexandre Joyner MD

## 2024-10-08 DIAGNOSIS — E03.8 OTHER SPECIFIED HYPOTHYROIDISM: ICD-10-CM

## 2024-10-09 RX ORDER — LEVOTHYROXINE SODIUM 50 UG/1
50 TABLET ORAL DAILY
Qty: 90 TABLET | Refills: 3 | Status: SHIPPED | OUTPATIENT
Start: 2024-10-09

## 2024-10-15 ENCOUNTER — LAB (OUTPATIENT)
Dept: LAB | Facility: LAB | Age: 84
End: 2024-10-15
Payer: COMMERCIAL

## 2024-10-15 DIAGNOSIS — E03.9 ACQUIRED HYPOTHYROIDISM: ICD-10-CM

## 2024-10-15 DIAGNOSIS — I10 ESSENTIAL HYPERTENSION: ICD-10-CM

## 2024-10-15 DIAGNOSIS — E78.2 MIXED HYPERLIPIDEMIA: ICD-10-CM

## 2024-10-15 LAB
ALBUMIN SERPL BCP-MCNC: 4.3 G/DL (ref 3.4–5)
ALP SERPL-CCNC: 85 U/L (ref 33–136)
ALT SERPL W P-5'-P-CCNC: 13 U/L (ref 10–52)
ANION GAP SERPL CALCULATED.3IONS-SCNC: 11 MMOL/L (ref 10–20)
AST SERPL W P-5'-P-CCNC: 20 U/L (ref 9–39)
BASOPHILS # BLD AUTO: 0.04 X10*3/UL (ref 0–0.1)
BASOPHILS NFR BLD AUTO: 0.8 %
BILIRUB SERPL-MCNC: 1.5 MG/DL (ref 0–1.2)
BUN SERPL-MCNC: 23 MG/DL (ref 6–23)
CALCIUM SERPL-MCNC: 10.3 MG/DL (ref 8.6–10.3)
CHLORIDE SERPL-SCNC: 108 MMOL/L (ref 98–107)
CHOLEST SERPL-MCNC: 235 MG/DL (ref 0–199)
CHOLEST/HDLC SERPL: 3.9 {RATIO}
CO2 SERPL-SCNC: 29 MMOL/L (ref 21–32)
CREAT SERPL-MCNC: 1.22 MG/DL (ref 0.5–1.3)
EGFRCR SERPLBLD CKD-EPI 2021: 58 ML/MIN/1.73M*2
EOSINOPHIL # BLD AUTO: 0.15 X10*3/UL (ref 0–0.4)
EOSINOPHIL NFR BLD AUTO: 3 %
ERYTHROCYTE [DISTWIDTH] IN BLOOD BY AUTOMATED COUNT: 14.4 % (ref 11.5–14.5)
GLUCOSE SERPL-MCNC: 99 MG/DL (ref 74–99)
HCT VFR BLD AUTO: 50.4 % (ref 41–52)
HDLC SERPL-MCNC: 59.9 MG/DL
HGB BLD-MCNC: 16.4 G/DL (ref 13.5–17.5)
IMM GRANULOCYTES # BLD AUTO: 0.02 X10*3/UL (ref 0–0.5)
IMM GRANULOCYTES NFR BLD AUTO: 0.4 % (ref 0–0.9)
LDLC SERPL CALC-MCNC: 149 MG/DL
LYMPHOCYTES # BLD AUTO: 1.08 X10*3/UL (ref 0.8–3)
LYMPHOCYTES NFR BLD AUTO: 21.3 %
MCH RBC QN AUTO: 33.3 PG (ref 26–34)
MCHC RBC AUTO-ENTMCNC: 32.5 G/DL (ref 32–36)
MCV RBC AUTO: 102 FL (ref 80–100)
MONOCYTES # BLD AUTO: 0.49 X10*3/UL (ref 0.05–0.8)
MONOCYTES NFR BLD AUTO: 9.7 %
NEUTROPHILS # BLD AUTO: 3.28 X10*3/UL (ref 1.6–5.5)
NEUTROPHILS NFR BLD AUTO: 64.8 %
NON HDL CHOLESTEROL: 175 MG/DL (ref 0–149)
NRBC BLD-RTO: 0 /100 WBCS (ref 0–0)
PLATELET # BLD AUTO: 234 X10*3/UL (ref 150–450)
POTASSIUM SERPL-SCNC: 4.9 MMOL/L (ref 3.5–5.3)
PROT SERPL-MCNC: 6.6 G/DL (ref 6.4–8.2)
RBC # BLD AUTO: 4.92 X10*6/UL (ref 4.5–5.9)
SODIUM SERPL-SCNC: 143 MMOL/L (ref 136–145)
TRIGL SERPL-MCNC: 131 MG/DL (ref 0–149)
TSH SERPL-ACNC: 3.77 MIU/L (ref 0.44–3.98)
VLDL: 26 MG/DL (ref 0–40)
WBC # BLD AUTO: 5.1 X10*3/UL (ref 4.4–11.3)

## 2024-10-15 PROCEDURE — 80053 COMPREHEN METABOLIC PANEL: CPT

## 2024-10-15 PROCEDURE — 36415 COLL VENOUS BLD VENIPUNCTURE: CPT

## 2024-10-15 PROCEDURE — 80061 LIPID PANEL: CPT

## 2024-10-15 PROCEDURE — 85025 COMPLETE CBC W/AUTO DIFF WBC: CPT

## 2024-10-15 PROCEDURE — 84443 ASSAY THYROID STIM HORMONE: CPT

## 2024-10-17 ENCOUNTER — APPOINTMENT (OUTPATIENT)
Dept: AUDIOLOGY | Facility: CLINIC | Age: 84
End: 2024-10-17
Payer: MEDICARE

## 2024-10-17 ENCOUNTER — APPOINTMENT (OUTPATIENT)
Dept: AUDIOLOGY | Facility: CLINIC | Age: 84
End: 2024-10-17

## 2024-10-17 NOTE — PROGRESS NOTES
[facility]: [facility]  MEDICARE WELLNESS EXAM      Joseph Hicks is a 83 y.o. male that is presenting today for cpe.    Assessment/Plan    Diagnoses and all orders for this visit:  Annual physical exam  Stage 3a chronic kidney disease (CKD) (Multi)  Overactive bladder  Other chronic pulmonary embolism without acute cor pulmonale  -     rivaroxaban (Xarelto) 20 mg tablet; Take 1 tablet (20 mg) by mouth once daily. Take with food.  Essential hypertension  -     CBC and Auto Differential; Future  -     Comprehensive Metabolic Panel; Future  Depressive disorder  Chronic diastolic congestive heart failure  Atherosclerotic heart disease of native coronary artery with other forms of angina pectoris  BPH without urinary obstruction  Mixed hyperlipidemia  -     Hepatic Function Panel; Future  -     Lipid Panel; Future  -     TSH with reflex to Free T4 if abnormal; Future  -     rosuvastatin (Crestor) 20 mg tablet; Take 1 tablet (20 mg) by mouth once daily.  Other orders  -     Follow Up In Primary Care - Medicare Annual  -     Follow Up In Primary Care - Established; Future  -     Follow Up In Primary Care - Established; Future  We completed the medicare wellness exam today.  The lifestyle is reviewed and recommendations for diet and exercise are made. We reviewed the immunizations and cancer screening and recommendations for needed immunizations and screenings are made.  The patient is independent in all ADL, shows no clinical signs of cognitive impairment, and is not falling or at risk for such.     Also sees jesús Simpson patel, siminovitch.    In terms of AWV elements a few very important things:  1.  Advanced directives-he does have a power of  for healthcare.  Of course he does he is an .  2.  Cancer screenings-he is beyond the age of needing to do this  3.  Immunizations-he had Pneumovax in 2020 Tdap in 2017 Shingrix x 2 in 2018 RSV vaccine in 2023 and just  recently had his flu and COVID shot.  It does appear that he is fully up-to-date.    In terms of his chronic medical problems a few things to note.  Hypertension-stable and under control  Hyperlipidemia-I think he was doing better when he was on the statin and we will restart a statin agent.  Hypothyroidism-clinically and chemically euthyroid right now.  Diastolic heart failure-patient does not wish to take a diuretic and is off of such now and doing fine.    All in all I am just adding a statin and I will see him back in 3 months  We did notice in his blood work that his bilirubin is isolated and elevated.  Uncertain why this would be I am just going to recheck this in a few weeks.    Routine follow-up in my office in 3 months  ADVANCED CARE PLANNING  Advanced Care Planning was discussed with patient:  The patient has an active advanced care plan on file. The patient has an active surrogate decision-maker on file.  Encouraged the patient to confirm that Living Will and Healthcare Power of  (HCPoA) are accurate and up to date.  Encouraged the patient to confirm that our office be provided a copy of any documentation in the event that anything changes.    ACTIVITIES OF DAILY LIVING  Basic ADLs:  Bathing: Independent, Dressing: Independent, Toileting: Independent, Transferring: Independent, Continence: Independent, Feeding: Independent.    Instrumental ADLs:  Ability to use phone: Independent, Shopping: Independent, Cooking: Independent, House-keeping: Independent, Laundry: Independent, Transportation: Independent, Medication Management: Independent, Finance Management: Independent.    Subjective   He is here for his annual wellness visit and follow-up for his chronic medical problems last seen by me in July.  In the interim he has been tolerating the Xarelto for his DVT/PE very well.  He does keep follow-up with pulmonary and with his hematologist on a regular basis in that regard.  He does complain still  that his legs are weak.  We had given him a drug holiday from his statin drug but despite that his legs have felt very weak.  He wonders if he should perhaps restart that medication.      Review of Systems   Constitutional: Negative.    HENT: Negative.     Eyes: Negative.    Respiratory: Negative.     Cardiovascular: Negative.    Gastrointestinal: Negative.    Endocrine: Negative.    Genitourinary: Negative.    Musculoskeletal:  Positive for arthralgias.        Chronic muscle weakness - was not helped with stopping statin    Skin: Negative.    Allergic/Immunologic: Negative.    Neurological:  Positive for numbness.        Chronically poor balance - only one fall in the past.   Hematological: Negative.    Psychiatric/Behavioral: Negative.     All other systems reviewed and are negative.    Objective   Vitals:    10/21/24 1524   BP: 130/86   Pulse: 89   Temp: 35.8 °C (96.4 °F)   SpO2: 97%      Body mass index is 31.42 kg/m².  Physical Exam  Diagnostic Results   Lab Results   Component Value Date    GLUCOSE 99 10/15/2024    CALCIUM 10.3 10/15/2024     10/15/2024    K 4.9 10/15/2024    CO2 29 10/15/2024     (H) 10/15/2024    BUN 23 10/15/2024    CREATININE 1.22 10/15/2024     Lab Results   Component Value Date    ALT 13 10/15/2024    AST 20 10/15/2024    ALKPHOS 85 10/15/2024    BILITOT 1.5 (H) 10/15/2024     Lab Results   Component Value Date    WBC 5.1 10/15/2024    HGB 16.4 10/15/2024    HCT 50.4 10/15/2024     (H) 10/15/2024     10/15/2024     Lab Results   Component Value Date    CHOL 235 (H) 10/15/2024    CHOL 165 05/09/2024    CHOL 152 10/27/2023     Lab Results   Component Value Date    HDL 59.9 10/15/2024    HDL 73.0 05/09/2024    HDL 70.0 10/27/2023     Lab Results   Component Value Date    LDLCALC 149 (H) 10/15/2024    LDLCALC 64 (L) 05/09/2024    LDLCALC 62 (L) 10/27/2023     Lab Results   Component Value Date    TRIG 131 10/15/2024    TRIG 142 05/09/2024    TRIG 99 10/27/2023  "    No components found for: \"CHOLHDL\"  Lab Results   Component Value Date    HGBA1C 5.7 05/27/2022     Other labs not included in the list above reviewed either before or during this encounter.    History   Past Medical History:   Diagnosis Date    Airway clearance impairment 08/26/2023    Angina pectoris 08/26/2023    Arthritis of hip 08/26/2023    Arthritis of right hip 08/26/2023    At risk for bleeding 08/26/2023    Atherosclerotic heart disease of native coronary artery with other forms of angina pectoris 03/18/2019    Benign prostatic hyperplasia without urinary obstruction 03/18/2019    Bilateral sensorineural hearing loss 08/26/2023    Chronic rhinitis 05/06/2024    Congestive heart failure 11/19/2023    Coronary arteriosclerosis 03/18/2019    Depression 08/26/2023    Depressive disorder 03/18/2019    Essential hypertension 08/26/2023    External hemorrhoids 08/26/2023    Gastroesophageal reflux disease 08/26/2023    Gout 08/26/2023    History of lung cancer 10/26/2023    History of malignant neoplasm 05/06/2024    History of thromboembolism of vein 08/26/2023    History of venous thromboembolism 08/26/2023    Hypertension 09/13/2022    Hypothyroidism 08/26/2023    Impaired gas exchange 08/26/2023    Kidney stone 08/26/2023    Lung cancer (Multi) 2012    nonsmall cell    Malignant neoplasm of lung (Multi) 03/18/2019    chemo and radiation, remission for 6 years    Mixed hyperlipidemia 08/26/2023    Neuropathy 08/26/2023    Overactive bladder 08/26/2023    Personal history of malignant neoplasm, unspecified     History of cancer    Primary malignant neoplasm of lung (Multi) 09/20/2012    Pulmonary embolism 2012    Pulmonary embolism 07/2024    Rash and other nonspecific skin eruption 09/13/2022    Sensorineural hearing loss (SNHL) of both ears 08/26/2023    Squamous cell carcinoma lung, 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    Stage 3 chronic kidney disease (Multi) 11/19/2023    Stage 3a chronic kidney disease (CKD) " (Multi) 2023    Wax in ear 2024     Past Surgical History:   Procedure Laterality Date    CARDIAC CATHETERIZATION      COLONOSCOPY      CT GUIDED IMAGING FOR NEEDLE PLACEMENT  2012    CT GUIDED IMAGING FOR NEEDLE PLACEMENT [facility]    OTHER SURGICAL HISTORY  2022    Hip replacement    TOTAL HIP ARTHROPLASTY Left      Family History   Problem Relation Name Age of Onset    Hypothyroidism Daughter       Social History     Socioeconomic History    Marital status:      Spouse name: Not on file    Number of children: Not on file    Years of education: Not on file    Highest education level: Not on file   Occupational History    Not on file   Tobacco Use    Smoking status: Former     Current packs/day: 0.00     Types: Cigarettes     Quit date:      Years since quittin.8     Passive exposure: Past    Smokeless tobacco: Never   Vaping Use    Vaping status: Never Used   Substance and Sexual Activity    Alcohol use: Not Currently     Alcohol/week: 6.0 standard drinks of alcohol     Types: 6 Standard drinks or equivalent per week    Drug use: Never    Sexual activity: Never   Other Topics Concern    Not on file   Social History Narrative    Not on file     Social Drivers of Health     Financial Resource Strain: Not on file   Food Insecurity: Not on file   Transportation Needs: Not on file   Physical Activity: Not on file   Stress: Not on file   Social Connections: Not on file   Intimate Partner Violence: Not on file   Housing Stability: Not on file     No Known Allergies  Current Outpatient Medications on File Prior to Visit   Medication Sig Dispense Refill    allopurinol (Zyloprim) 100 mg tablet Take 1 tablet (100 mg) by mouth once daily. 90 tablet 3    levothyroxine (Synthroid, Levoxyl) 50 mcg tablet Take 1 tablet (50 mcg) by mouth once daily. 90 tablet 3    multivitamin with minerals (multivitamin with folic acid) tablet Take 1 tablet by mouth once daily.  Additional  Instructions: SUPPLEMENT      omeprazole (PriLOSEC) 40 mg DR capsule Take 1 capsule (40 mg) by mouth once daily.  Additional Instructions: REFLUX      oxybutynin XL (Ditropan-XL) 10 mg 24 hr tablet Take 1 tablet (10 mg) by mouth once daily.      valsartan (Diovan) 80 mg tablet Take 1 tablet (80 mg) by mouth once daily.      [DISCONTINUED] Xarelto 15 mg tablet TAKE 1 TABLET BY MOUTH DAILY WITH DINNER FOR 21 DAYS      [DISCONTINUED] aspirin 81 mg EC tablet Take 1 tablet (81 mg) by mouth once daily.      [DISCONTINUED] rivaroxaban (Xarelto) 15 mg tablet Take 1 tablet (15 mg) by mouth 2 times a day for 14 days. Take with food. 28 tablet 0    [DISCONTINUED] rivaroxaban (Xarelto) 20 mg tablet Take 1 tablet (20 mg) by mouth once daily. Take with food.      [DISCONTINUED] torsemide (Demadex) 5 mg tablet Take 1 tablet (5 mg) by mouth once daily.       No current facility-administered medications on file prior to visit.     Immunization History   Administered Date(s) Administered    Flu vaccine, quadrivalent, high-dose, preservative free, age 65y+ (FLUZONE) 09/13/2020, 10/01/2020, 09/25/2021, 10/10/2022, 09/26/2023    Flu vaccine, trivalent, preservative free, HIGH-DOSE, age 65y+ (Fluzone) 10/02/2017, 10/02/2018, 10/04/2019    Influenza, injectable, quadrivalent 09/23/2016    Influenza, seasonal, injectable 11/02/2013, 10/01/2014, 10/06/2015    Moderna COVID-19 vaccine, 12 years and older (50mcg/0.5mL)(Spikevax) 10/29/2023    Moderna COVID-19 vaccine, bivalent, blue cap/gray label *Check age/dose* 12/06/2022, 06/16/2023    Moderna SARS-CoV-2 Vaccination 01/15/2021, 02/01/2021, 02/16/2021, 03/02/2021, 11/04/2021, 08/16/2022    Pneumococcal conjugate vaccine, 13-valent (PREVNAR 13) 05/08/2015    Pneumococcal polysaccharide vaccine, 23-valent, age 2 years and older (PNEUMOVAX 23) 05/02/2014, 11/06/2020    RSV, 60 Years And Older (AREXVY) 10/29/2023    Tdap vaccine, age 7 year and older (BOOSTRIX, ADACEL) 10/04/2017    Zoster  vaccine, recombinant, adult (SHINGRIX) 01/05/2018, 04/08/2018, 07/20/2018, 05/01/2019    Zoster, live 01/02/2012     Patient's medical history was reviewed and updated either before or during this encounter.     Bogdan Busby MD

## 2024-10-21 ENCOUNTER — OFFICE VISIT (OUTPATIENT)
Dept: PRIMARY CARE | Facility: CLINIC | Age: 84
End: 2024-10-21
Payer: MEDICARE

## 2024-10-21 VITALS
HEART RATE: 89 BPM | OXYGEN SATURATION: 97 % | SYSTOLIC BLOOD PRESSURE: 130 MMHG | TEMPERATURE: 96.4 F | BODY MASS INDEX: 31.35 KG/M2 | HEIGHT: 70 IN | DIASTOLIC BLOOD PRESSURE: 86 MMHG | WEIGHT: 219 LBS

## 2024-10-21 DIAGNOSIS — F32.A DEPRESSIVE DISORDER: ICD-10-CM

## 2024-10-21 DIAGNOSIS — I25.118 ATHEROSCLEROTIC HEART DISEASE OF NATIVE CORONARY ARTERY WITH OTHER FORMS OF ANGINA PECTORIS: ICD-10-CM

## 2024-10-21 DIAGNOSIS — E78.2 MIXED HYPERLIPIDEMIA: ICD-10-CM

## 2024-10-21 DIAGNOSIS — I50.32 CHRONIC DIASTOLIC CONGESTIVE HEART FAILURE: ICD-10-CM

## 2024-10-21 DIAGNOSIS — I27.82 OTHER CHRONIC PULMONARY EMBOLISM WITHOUT ACUTE COR PULMONALE: ICD-10-CM

## 2024-10-21 DIAGNOSIS — I10 ESSENTIAL HYPERTENSION: ICD-10-CM

## 2024-10-21 DIAGNOSIS — N18.31 STAGE 3A CHRONIC KIDNEY DISEASE (CKD) (MULTI): ICD-10-CM

## 2024-10-21 DIAGNOSIS — N32.81 OVERACTIVE BLADDER: ICD-10-CM

## 2024-10-21 DIAGNOSIS — N40.0 BPH WITHOUT URINARY OBSTRUCTION: ICD-10-CM

## 2024-10-21 DIAGNOSIS — Z00.00 ANNUAL PHYSICAL EXAM: Primary | ICD-10-CM

## 2024-10-21 PROCEDURE — G0439 PPPS, SUBSEQ VISIT: HCPCS | Performed by: INTERNAL MEDICINE

## 2024-10-21 PROCEDURE — 1126F AMNT PAIN NOTED NONE PRSNT: CPT | Performed by: INTERNAL MEDICINE

## 2024-10-21 PROCEDURE — 1159F MED LIST DOCD IN RCRD: CPT | Performed by: INTERNAL MEDICINE

## 2024-10-21 PROCEDURE — 99215 OFFICE O/P EST HI 40 MIN: CPT | Performed by: INTERNAL MEDICINE

## 2024-10-21 PROCEDURE — 99213 OFFICE O/P EST LOW 20 MIN: CPT | Performed by: INTERNAL MEDICINE

## 2024-10-21 PROCEDURE — 1123F ACP DISCUSS/DSCN MKR DOCD: CPT | Performed by: INTERNAL MEDICINE

## 2024-10-21 PROCEDURE — 1160F RVW MEDS BY RX/DR IN RCRD: CPT | Performed by: INTERNAL MEDICINE

## 2024-10-21 PROCEDURE — 3079F DIAST BP 80-89 MM HG: CPT | Performed by: INTERNAL MEDICINE

## 2024-10-21 PROCEDURE — 1158F ADVNC CARE PLAN TLK DOCD: CPT | Performed by: INTERNAL MEDICINE

## 2024-10-21 PROCEDURE — 3075F SYST BP GE 130 - 139MM HG: CPT | Performed by: INTERNAL MEDICINE

## 2024-10-21 RX ORDER — ROSUVASTATIN CALCIUM 20 MG/1
20 TABLET, COATED ORAL DAILY
Qty: 90 TABLET | Refills: 3 | Status: SHIPPED | OUTPATIENT
Start: 2024-10-21 | End: 2025-10-21

## 2024-10-21 ASSESSMENT — LIFESTYLE VARIABLES
HOW OFTEN DURING THE LAST YEAR HAVE YOU FOUND THAT YOU WERE NOT ABLE TO STOP DRINKING ONCE YOU HAD STARTED: NEVER
AUDIT TOTAL SCORE: 0
HOW OFTEN DURING THE LAST YEAR HAVE YOU FAILED TO DO WHAT WAS NORMALLY EXPECTED FROM YOU BECAUSE OF DRINKING: NEVER
HAVE YOU OR SOMEONE ELSE BEEN INJURED AS A RESULT OF YOUR DRINKING: NO
HOW OFTEN DO YOU HAVE A DRINK CONTAINING ALCOHOL: NEVER
HOW OFTEN DO YOU HAVE SIX OR MORE DRINKS ON ONE OCCASION: NEVER
HOW MANY STANDARD DRINKS CONTAINING ALCOHOL DO YOU HAVE ON A TYPICAL DAY: PATIENT DOES NOT DRINK
HOW OFTEN DURING THE LAST YEAR HAVE YOU BEEN UNABLE TO REMEMBER WHAT HAPPENED THE NIGHT BEFORE BECAUSE YOU HAD BEEN DRINKING: NEVER
HOW OFTEN DURING THE LAST YEAR HAVE YOU NEEDED AN ALCOHOLIC DRINK FIRST THING IN THE MORNING TO GET YOURSELF GOING AFTER A NIGHT OF HEAVY DRINKING: NEVER
AUDIT-C TOTAL SCORE: 0
SKIP TO QUESTIONS 9-10: 1
HOW OFTEN DURING THE LAST YEAR HAVE YOU HAD A FEELING OF GUILT OR REMORSE AFTER DRINKING: NEVER
HAS A RELATIVE, FRIEND, DOCTOR, OR ANOTHER HEALTH PROFESSIONAL EXPRESSED CONCERN ABOUT YOUR DRINKING OR SUGGESTED YOU CUT DOWN: NO

## 2024-10-21 ASSESSMENT — ENCOUNTER SYMPTOMS
DEPRESSION: 0
CONSTITUTIONAL NEGATIVE: 1
ENDOCRINE NEGATIVE: 1
ARTHRALGIAS: 1
CARDIOVASCULAR NEGATIVE: 1
LOSS OF SENSATION IN FEET: 1
GASTROINTESTINAL NEGATIVE: 1
PSYCHIATRIC NEGATIVE: 1
HEMATOLOGIC/LYMPHATIC NEGATIVE: 1
EYES NEGATIVE: 1
ALLERGIC/IMMUNOLOGIC NEGATIVE: 1
NUMBNESS: 1
RESPIRATORY NEGATIVE: 1
OCCASIONAL FEELINGS OF UNSTEADINESS: 1

## 2024-10-21 ASSESSMENT — PAIN SCALES - GENERAL: PAINLEVEL_OUTOF10: 0-NO PAIN

## 2024-10-21 NOTE — PATIENT INSTRUCTIONS
We were able to do your annual wellness visit/follow-up for your chronic medical problems today here a few important points.  1.  Advanced directives and glad you have a power of  for healthcare  2.  Cancer screenings-you do not need to do these any further at your age  3.  Immunizations-you are fully up-to-date right now.  You had COVID shot and flu shot recently.  You had your RSV shot in 2023 and your shingles shots in 2018 you had a pneumonia shot in 2020.  You had a tetanus shot in 2017 you will be due for an update of your tetanus shot/Tdap until 2027    In terms of your chronic medical problems leave your medicines the same with the 1 exception we are going to put you back on a cholesterol-lowering medicine and see how you do with that.  Also we noticed elevated bilirubin of uncertain cause and just ordered a blood test that you can do again in a few weeks and we will see where this comes out.  I will let you know about that.    I look forward to seeing you in 3 months.

## 2024-10-22 ENCOUNTER — PATIENT OUTREACH (OUTPATIENT)
Dept: PRIMARY CARE | Facility: CLINIC | Age: 84
End: 2024-10-22
Payer: MEDICARE

## 2024-10-23 ENCOUNTER — APPOINTMENT (OUTPATIENT)
Dept: AUDIOLOGY | Facility: CLINIC | Age: 84
End: 2024-10-23
Payer: MEDICARE

## 2024-10-23 ENCOUNTER — APPOINTMENT (OUTPATIENT)
Dept: AUDIOLOGY | Facility: CLINIC | Age: 84
End: 2024-10-23

## 2024-10-23 DIAGNOSIS — I10 ESSENTIAL HYPERTENSION: ICD-10-CM

## 2024-10-23 DIAGNOSIS — H90.3 SENSORINEURAL HEARING LOSS (SNHL) OF BOTH EARS: Primary | ICD-10-CM

## 2024-10-23 PROCEDURE — 92557 COMPREHENSIVE HEARING TEST: CPT | Performed by: AUDIOLOGIST

## 2024-10-23 RX ORDER — VALSARTAN 80 MG/1
80 TABLET ORAL DAILY
Qty: 90 TABLET | Refills: 3 | Status: SHIPPED | OUTPATIENT
Start: 2024-10-23

## 2024-10-23 NOTE — PROGRESS NOTES
HEARING AID FITTING    RIGHT: PHONAK AUDEO I90 SPHERE RECHARGEABLE TRANG WITH A #2P  AND LARGE POWER DOME WITHOUT RETENTION TAIL  S.N.: [id number]  LEFT:  PHONAK AUDEO I90 SPHERE RECHARGEABLE TRANG WITH A #2P  AND LARGE POWER DOME WITHOUT RETENTION TAIL  S.N.: [id number]  WARRANTY EXPIRES: 10/20/2027  CERUSTOP WAX GUARDS    Fit the patient with the above listed hearing aids set to 100% with semi linear compression.  Raised the gain and MPO's to set the level at a comfortable listening level for the patient.  Discussed use and care of the hearing aids with the patient.  He previously wore Phonak hearing aids and is familiar with how they function. Paired his hearing aids to his cell phone for streaming phone calls and audio.  Also paired the hearing aids to the Balzo carlos.  He will bring his old hearing aids and Zi accessory to our next appointment so he can use the Zi with his new aids.  In addition to today's verbal instruction of the hearing devices, the patient was given written instructions from the hearing aid . Hearing aid limitations were discussed at length as well as realistic expectations. The patient was advised in order to receive full benefit of amplification, consistent use during all waking hours is recommended.  The repair warranty and the conditions of the right-to-return period were discussed. The patient reports understanding of these conditions. Purchase agreement was signed (see scanned documents).  Patient will return in 3 weeks for a hearing aid check.     Appointment time:  11:00-12:00

## 2024-10-23 NOTE — TELEPHONE ENCOUNTER
LV 10/21/24, NV 1/14/25    Valsartan 80 mg    [facility] [address] [place], [place] [address]or [place], [place]

## 2024-10-23 NOTE — PROGRESS NOTES
AUDIOLOGY ADULT AUDIOMETRIC EVALUATION    Name:  Joseph Hicks  :  1940  Age:  83 y.o.  Date of Evaluation:  2024    Reason for visit: Mr. Hicks is seen in the clinic today for an audiologic evaluation.     HISTORY  The patient has a history of bilateral sensorineural hearing loss and wears binaural hearing aids.       EVALUATION  See scanned audiogram: “Media” > “Audiology Report”.      RESULTS  Otoscopic Evaluation:  Right Ear: clear ear canal  Left Ear: clear ear canal    Immittance Measures:  Tympanometry:  Right Ear: Could not evaluate since an adequate seal could not be maintained    Left Ear: Could not evaluate since an adequate seal could not be maintained      Acoustic Reflexes:  Ipsilateral Right Ear: Could not evaluate since an adequate seal could not be maintained    Ipsilateral Left Ear: Could not evaluate since an adequate seal could not be maintained    Contralateral Right Ear: did not evaluate  Contralateral Left Ear: did not evaluate    Distortion Product Otoacoustic Emissions (DPOAEs):  Right Ear: did not evaluate   Left Ear: did not evaluate     Audiometry:  Test Technique and Reliability:   Standard audiometry via supra-aural headphones. Reliability is good.    Pure tone air and bone conduction audiometry:  Right Ear: moderately-severe to severe sensorineural hearing loss   Left Ear: moderate to severe sensorineural hearing loss     Speech Audiometry (Word Recognition Scores):   Right Ear: Good, 80% in quiet at an elevated presentation level   Left Ear: Excellent, 92% in quiet at an elevated presentation level     IMPRESSIONS  Results of today's audiometric evaluation revealed a bilateral sensorineural hearing loss.     RECOMMENDATIONS  - Annual audiologic evaluation, sooner if an acute change is noted.  - Continued hearing aid use.  - Follow-up with audiology annually for routine hearing aid maintenance, sooner if questions/problems arise.    PATIENT  EDUCATION  Discussed results, impressions and recommendations with the patient. Questions were addressed and the patient was encouraged to contact our office should concerns arise.    Time for this encounter: 10:30-11:00    Raquel Wang M.A., CCC-A   Licensed Audiologist

## 2024-11-07 ENCOUNTER — LAB (OUTPATIENT)
Dept: LAB | Facility: LAB | Age: 84
End: 2024-11-07
Payer: MEDICARE

## 2024-11-07 DIAGNOSIS — E78.2 MIXED HYPERLIPIDEMIA: ICD-10-CM

## 2024-11-07 LAB
ALBUMIN SERPL BCP-MCNC: 4.3 G/DL (ref 3.4–5)
ALP SERPL-CCNC: 87 U/L (ref 33–136)
ALT SERPL W P-5'-P-CCNC: 18 U/L (ref 10–52)
AST SERPL W P-5'-P-CCNC: 24 U/L (ref 9–39)
BILIRUB DIRECT SERPL-MCNC: 0.2 MG/DL (ref 0–0.3)
BILIRUB SERPL-MCNC: 1.2 MG/DL (ref 0–1.2)
PROT SERPL-MCNC: 6.8 G/DL (ref 6.4–8.2)

## 2024-11-07 PROCEDURE — 36415 COLL VENOUS BLD VENIPUNCTURE: CPT

## 2024-11-07 PROCEDURE — 80076 HEPATIC FUNCTION PANEL: CPT

## 2024-11-13 ENCOUNTER — TELEPHONE (OUTPATIENT)
Dept: CARDIOLOGY | Facility: CLINIC | Age: 84
End: 2024-11-13

## 2024-11-13 ENCOUNTER — APPOINTMENT (OUTPATIENT)
Dept: AUDIOLOGY | Facility: CLINIC | Age: 84
End: 2024-11-13

## 2024-11-13 DIAGNOSIS — H90.3 SENSORINEURAL HEARING LOSS (SNHL) OF BOTH EARS: Primary | ICD-10-CM

## 2024-11-13 PROCEDURE — HRANC PR HEARING AID NO CHARGE: Performed by: AUDIOLOGIST

## 2024-11-13 NOTE — PROGRESS NOTES
HEARING AID CHECK     RIGHT: PHONAK AUDEO I90 SPHERE RECHARGEABLE TRANG WITH A #2P  AND LARGE POWER DOME WITHOUT RETENTION TAIL  S.N.: [id number]  LEFT:  PHONAK AUDEO I90 SPHERE RECHARGEABLE TRANG WITH A #2P  AND LARGE POWER DOME WITHOUT RETENTION TAIL  S.N.: [id number]  WARRANTY EXPIRES: 10/20/2027  CERUSTOP WAX GUARDS     The patient reported that he is doing fairly well with his new hearing aids.  He hears better than with his old aids.  Performed speechmapping.  The patient likes the gain above the targets.  Increased the high frequency gain slightly.  Created a spheric speech in loud noise program for the patient to access using his carlos.  Uninstalled the Zi  from the patient's old hearing aids and installed it to the new aids.  The new aids are now paired to the Zi device.  Gave him replacement domes.  He will return in three weeks.  If he is not getting the clarity of speech he would like I recommended that I lower the gain to targets and only increase the high frequency gain.       Appointment time:  3:00-3:30

## 2024-11-13 NOTE — TELEPHONE ENCOUNTER
Called and spoke with patient 11/13/24 at 1:30 pm- changed 11/22/24 appt to 12/4/24 at 2:15 pm due to Dr. Severino having hospital rounds.

## 2024-11-20 ENCOUNTER — APPOINTMENT (OUTPATIENT)
Dept: OTOLARYNGOLOGY | Facility: CLINIC | Age: 84
End: 2024-11-20
Payer: MEDICARE

## 2024-11-22 ENCOUNTER — APPOINTMENT (OUTPATIENT)
Dept: CARDIOLOGY | Facility: CLINIC | Age: 84
End: 2024-11-22
Payer: MEDICARE

## 2024-12-04 ENCOUNTER — APPOINTMENT (OUTPATIENT)
Dept: CARDIOLOGY | Facility: CLINIC | Age: 84
End: 2024-12-04
Payer: MEDICARE

## 2024-12-05 ENCOUNTER — APPOINTMENT (OUTPATIENT)
Dept: AUDIOLOGY | Facility: CLINIC | Age: 84
End: 2024-12-05
Payer: MEDICARE

## 2024-12-12 ENCOUNTER — APPOINTMENT (OUTPATIENT)
Dept: AUDIOLOGY | Facility: CLINIC | Age: 84
End: 2024-12-12

## 2024-12-26 ENCOUNTER — APPOINTMENT (OUTPATIENT)
Dept: AUDIOLOGY | Facility: CLINIC | Age: 84
End: 2024-12-26

## 2024-12-26 DIAGNOSIS — H90.3 SENSORINEURAL HEARING LOSS (SNHL) OF BOTH EARS: Primary | ICD-10-CM

## 2024-12-31 NOTE — PROGRESS NOTES
HEARING AID CHECK     RIGHT: PHONAK AUDEO I90 SPHERE RECHARGEABLE TRANG WITH A #2P  AND LARGE POWER DOME WITHOUT RETENTION TAIL  S.N.: [id number]  LEFT:  PHONAK AUDEO I90 SPHERE RECHARGEABLE TRANG WITH A #2P  AND LARGE POWER DOME WITHOUT RETENTION TAIL  S.N.: [id number]  WARRANTY EXPIRES: 10/20/2027  CERUSTOP WAX GUARDS     The patient reported that overall, he is hearing pretty well with his hearing aids.  He believes they are slightly better than his old hearing aids.  The patient's frequency response is above his targets.  Reduced the very low and high frequencies slightly.  Reduced the activation level of the spheric speech in loud noise program from 10 to 5.  The patient purchased an extra  for his house in [place].  Recall in one year or sooner if needed.  $200.00 for the .     Appointment time:  11:30-12:00

## 2025-01-07 DIAGNOSIS — N32.81 OVERACTIVE BLADDER: ICD-10-CM

## 2025-01-07 RX ORDER — ALLOPURINOL 100 MG/1
100 TABLET ORAL
Qty: 90 TABLET | Refills: 3 | Status: SHIPPED | OUTPATIENT
Start: 2025-01-07

## 2025-01-07 RX ORDER — AZITHROMYCIN 250 MG/1
TABLET, FILM COATED ORAL
COMMUNITY
Start: 2024-12-23

## 2025-01-07 RX ORDER — BENZONATATE 100 MG/1
CAPSULE ORAL
COMMUNITY
Start: 2024-12-23

## 2025-01-07 RX ORDER — ALLOPURINOL 100 MG/1
1 TABLET ORAL
COMMUNITY
Start: 2024-12-12 | End: 2025-01-07 | Stop reason: SDUPTHER

## 2025-01-21 ENCOUNTER — APPOINTMENT (OUTPATIENT)
Dept: CARDIOLOGY | Facility: CLINIC | Age: 85
End: 2025-01-21
Payer: MEDICARE

## 2025-01-24 ENCOUNTER — LAB (OUTPATIENT)
Dept: LAB | Facility: LAB | Age: 85
End: 2025-01-24
Payer: MEDICARE

## 2025-01-24 ENCOUNTER — OFFICE VISIT (OUTPATIENT)
Dept: PRIMARY CARE | Facility: CLINIC | Age: 85
End: 2025-01-24
Payer: MEDICARE

## 2025-01-24 VITALS
DIASTOLIC BLOOD PRESSURE: 90 MMHG | HEART RATE: 63 BPM | OXYGEN SATURATION: 99 % | TEMPERATURE: 97.3 F | SYSTOLIC BLOOD PRESSURE: 128 MMHG | WEIGHT: 221 LBS | BODY MASS INDEX: 31.71 KG/M2

## 2025-01-24 DIAGNOSIS — Z86.718 HISTORY OF VENOUS THROMBOEMBOLISM: ICD-10-CM

## 2025-01-24 DIAGNOSIS — E78.2 MIXED HYPERLIPIDEMIA: ICD-10-CM

## 2025-01-24 DIAGNOSIS — N18.31 STAGE 3A CHRONIC KIDNEY DISEASE (CKD) (MULTI): ICD-10-CM

## 2025-01-24 DIAGNOSIS — N40.0 BPH WITHOUT URINARY OBSTRUCTION: ICD-10-CM

## 2025-01-24 DIAGNOSIS — E79.0 HYPERURICEMIA: ICD-10-CM

## 2025-01-24 DIAGNOSIS — I10 ESSENTIAL HYPERTENSION: Primary | ICD-10-CM

## 2025-01-24 DIAGNOSIS — R26.9 GAIT DIFFICULTY: ICD-10-CM

## 2025-01-24 DIAGNOSIS — I26.99 PULMONARY EMBOLISM WITHOUT ACUTE COR PULMONALE, UNSPECIFIED CHRONICITY, UNSPECIFIED PULMONARY EMBOLISM TYPE (MULTI): ICD-10-CM

## 2025-01-24 DIAGNOSIS — I50.9 CONGESTIVE HEART FAILURE, UNSPECIFIED HF CHRONICITY, UNSPECIFIED HEART FAILURE TYPE: ICD-10-CM

## 2025-01-24 DIAGNOSIS — F32.A DEPRESSION, UNSPECIFIED DEPRESSION TYPE: ICD-10-CM

## 2025-01-24 DIAGNOSIS — F32.A DEPRESSIVE DISORDER: ICD-10-CM

## 2025-01-24 DIAGNOSIS — I25.118 ATHEROSCLEROTIC HEART DISEASE OF NATIVE CORONARY ARTERY WITH OTHER FORMS OF ANGINA PECTORIS: ICD-10-CM

## 2025-01-24 DIAGNOSIS — N32.81 OVERACTIVE BLADDER: ICD-10-CM

## 2025-01-24 LAB
ALBUMIN SERPL BCP-MCNC: 4.6 G/DL (ref 3.4–5)
ALP SERPL-CCNC: 83 U/L (ref 33–136)
ALT SERPL W P-5'-P-CCNC: 17 U/L (ref 10–52)
ANION GAP SERPL CALCULATED.3IONS-SCNC: 12 MMOL/L (ref 10–20)
AST SERPL W P-5'-P-CCNC: 24 U/L (ref 9–39)
BASOPHILS # BLD AUTO: 0.05 X10*3/UL (ref 0–0.1)
BASOPHILS NFR BLD AUTO: 0.9 %
BILIRUB SERPL-MCNC: 1.5 MG/DL (ref 0–1.2)
BUN SERPL-MCNC: 17 MG/DL (ref 6–23)
CALCIUM SERPL-MCNC: 10.1 MG/DL (ref 8.6–10.3)
CHLORIDE SERPL-SCNC: 106 MMOL/L (ref 98–107)
CHOLEST SERPL-MCNC: 139 MG/DL (ref 0–199)
CHOLEST/HDLC SERPL: 2 {RATIO}
CO2 SERPL-SCNC: 28 MMOL/L (ref 21–32)
CREAT SERPL-MCNC: 1.29 MG/DL (ref 0.5–1.3)
EGFRCR SERPLBLD CKD-EPI 2021: 54 ML/MIN/1.73M*2
EOSINOPHIL # BLD AUTO: 0.18 X10*3/UL (ref 0–0.4)
EOSINOPHIL NFR BLD AUTO: 3.3 %
ERYTHROCYTE [DISTWIDTH] IN BLOOD BY AUTOMATED COUNT: 13.5 % (ref 11.5–14.5)
GLUCOSE SERPL-MCNC: 92 MG/DL (ref 74–99)
HCT VFR BLD AUTO: 50.8 % (ref 41–52)
HDLC SERPL-MCNC: 69.1 MG/DL
HGB BLD-MCNC: 16.7 G/DL (ref 13.5–17.5)
IMM GRANULOCYTES # BLD AUTO: 0.01 X10*3/UL (ref 0–0.5)
IMM GRANULOCYTES NFR BLD AUTO: 0.2 % (ref 0–0.9)
LDLC SERPL CALC-MCNC: 47 MG/DL
LYMPHOCYTES # BLD AUTO: 1.07 X10*3/UL (ref 0.8–3)
LYMPHOCYTES NFR BLD AUTO: 19.6 %
MCH RBC QN AUTO: 33.2 PG (ref 26–34)
MCHC RBC AUTO-ENTMCNC: 32.9 G/DL (ref 32–36)
MCV RBC AUTO: 101 FL (ref 80–100)
MONOCYTES # BLD AUTO: 0.42 X10*3/UL (ref 0.05–0.8)
MONOCYTES NFR BLD AUTO: 7.7 %
NEUTROPHILS # BLD AUTO: 3.72 X10*3/UL (ref 1.6–5.5)
NEUTROPHILS NFR BLD AUTO: 68.3 %
NON HDL CHOLESTEROL: 70 MG/DL (ref 0–149)
NRBC BLD-RTO: 0 /100 WBCS (ref 0–0)
PLATELET # BLD AUTO: 212 X10*3/UL (ref 150–450)
POTASSIUM SERPL-SCNC: 4.9 MMOL/L (ref 3.5–5.3)
PROT SERPL-MCNC: 6.9 G/DL (ref 6.4–8.2)
RBC # BLD AUTO: 5.03 X10*6/UL (ref 4.5–5.9)
SODIUM SERPL-SCNC: 141 MMOL/L (ref 136–145)
TRIGL SERPL-MCNC: 115 MG/DL (ref 0–149)
TSH SERPL-ACNC: 3.12 MIU/L (ref 0.44–3.98)
URATE SERPL-MCNC: 6 MG/DL (ref 4–7.5)
VLDL: 23 MG/DL (ref 0–40)
WBC # BLD AUTO: 5.5 X10*3/UL (ref 4.4–11.3)

## 2025-01-24 PROCEDURE — 1126F AMNT PAIN NOTED NONE PRSNT: CPT | Performed by: INTERNAL MEDICINE

## 2025-01-24 PROCEDURE — 80053 COMPREHEN METABOLIC PANEL: CPT

## 2025-01-24 PROCEDURE — 80061 LIPID PANEL: CPT

## 2025-01-24 PROCEDURE — G2211 COMPLEX E/M VISIT ADD ON: HCPCS | Performed by: INTERNAL MEDICINE

## 2025-01-24 PROCEDURE — 84443 ASSAY THYROID STIM HORMONE: CPT

## 2025-01-24 PROCEDURE — 84550 ASSAY OF BLOOD/URIC ACID: CPT

## 2025-01-24 PROCEDURE — 3080F DIAST BP >= 90 MM HG: CPT | Performed by: INTERNAL MEDICINE

## 2025-01-24 PROCEDURE — 1159F MED LIST DOCD IN RCRD: CPT | Performed by: INTERNAL MEDICINE

## 2025-01-24 PROCEDURE — 99214 OFFICE O/P EST MOD 30 MIN: CPT | Performed by: INTERNAL MEDICINE

## 2025-01-24 PROCEDURE — 3074F SYST BP LT 130 MM HG: CPT | Performed by: INTERNAL MEDICINE

## 2025-01-24 PROCEDURE — 1160F RVW MEDS BY RX/DR IN RCRD: CPT | Performed by: INTERNAL MEDICINE

## 2025-01-24 PROCEDURE — 85025 COMPLETE CBC W/AUTO DIFF WBC: CPT

## 2025-01-24 ASSESSMENT — PATIENT HEALTH QUESTIONNAIRE - PHQ9
1. LITTLE INTEREST OR PLEASURE IN DOING THINGS: NOT AT ALL
SUM OF ALL RESPONSES TO PHQ9 QUESTIONS 1 AND 2: 0
2. FEELING DOWN, DEPRESSED OR HOPELESS: NOT AT ALL

## 2025-01-24 ASSESSMENT — ENCOUNTER SYMPTOMS
CARDIOVASCULAR NEGATIVE: 1
GASTROINTESTINAL NEGATIVE: 1
RESPIRATORY NEGATIVE: 1

## 2025-01-24 ASSESSMENT — PAIN SCALES - GENERAL: PAINLEVEL_OUTOF10: 0-NO PAIN

## 2025-01-24 NOTE — PATIENT INSTRUCTIONS
Casey-  It looks like you are doing very well right now about the only thing we added is a physical therapy evaluation which you can do at that place where you were before.  I think they may be able to help you in terms of taking the right can teach you how to use it and so forth.    I will let you know about your blood work otherwise I will see you in 3 months

## 2025-01-24 NOTE — PROGRESS NOTES
[facility]: [facility]  PROGRESS NOTE      Joseph Hicks is a 84 y.o. male that is presenting today for Follow-up.    Assessment/Plan   Diagnoses and all orders for this visit:  Essential hypertension  Atherosclerotic heart disease of native coronary artery with other forms of angina pectoris  Pulmonary embolism without acute cor pulmonale, unspecified chronicity, unspecified pulmonary embolism type (Multi)  Mixed hyperlipidemia  -     Lipid Panel; Future  -     TSH with reflex to Free T4 if abnormal; Future  BPH without urinary obstruction  Depressive disorder  Overactive bladder  Stage 3a chronic kidney disease (CKD) (Multi)  -     CBC and Auto Differential; Future  -     Comprehensive Metabolic Panel; Future  Congestive heart failure, unspecified HF chronicity, unspecified heart failure type  History of venous thromboembolism  Depression, unspecified depression type  Gait difficulty  -     Referral to Physical Therapy; Future  Hyperuricemia  -     Uric Acid; Future  Other orders  -     Follow Up In Primary Care - Established  -     Follow Up In Primary Care - Established; Future  We considered the whole situation here.  1.  Gait training-he does have some gait instability which I think is just multifactorial we are going to send him to physical therapy for such.  2.  History of pulmonary embolism/DVT-we really discussed in great detail today that there are some risks of course from the anticoagulants but there is some benefit and that he has had high risk to have another clot if he were to come off of his medication.  Our plan is for lifelong anticoagulation unless he should develop bleeding problems at which time we would reassess the risk-benefit ratio.  Right now we think the benefits far outweigh the risk he really understands that  3.  Hyperlipidemia-we will recheck lipid panel  4.  History of CHF-clinically stable at the moment.  I will plan on seeing him back in 3  months  Subjective   Casey is here for his follow-up visit.  For the most part he been doing pretty well about the only issue that he really reports is that he has been having some balance issues as of late and I wonders if perhaps he could benefit from the use of a cane.  He does not want to fall and have a subdural.      Review of Systems   Respiratory: Negative.     Cardiovascular: Negative.    Gastrointestinal: Negative.    Genitourinary: Negative.       Objective   Vitals:    01/24/25 1108   BP: 128/90   Pulse: 63   Temp: 36.3 °C (97.3 °F)   SpO2: 99%      Body mass index is 31.71 kg/m².  Physical Exam  Cardiovascular:      Rate and Rhythm: Normal rate and regular rhythm.      Pulses: Normal pulses.      Heart sounds: Normal heart sounds.   Pulmonary:      Effort: Pulmonary effort is normal.      Breath sounds: Normal breath sounds.   Abdominal:      General: Abdomen is flat. Bowel sounds are normal.      Palpations: Abdomen is soft.   Musculoskeletal:         General: Normal range of motion.      Cervical back: Normal range of motion.       Diagnostic Results   Lab Results   Component Value Date    GLUCOSE 99 10/15/2024    CALCIUM 10.3 10/15/2024     10/15/2024    K 4.9 10/15/2024    CO2 29 10/15/2024     (H) 10/15/2024    BUN 23 10/15/2024    CREATININE 1.22 10/15/2024     Lab Results   Component Value Date    ALT 18 11/07/2024    AST 24 11/07/2024    ALKPHOS 87 11/07/2024    BILITOT 1.2 11/07/2024     Lab Results   Component Value Date    WBC 5.1 10/15/2024    HGB 16.4 10/15/2024    HCT 50.4 10/15/2024     (H) 10/15/2024     10/15/2024     Lab Results   Component Value Date    CHOL 235 (H) 10/15/2024    CHOL 165 05/09/2024    CHOL 152 10/27/2023     Lab Results   Component Value Date    HDL 59.9 10/15/2024    HDL 73.0 05/09/2024    HDL 70.0 10/27/2023     Lab Results   Component Value Date    LDLCALC 149 (H) 10/15/2024    LDLCALC 64 (L) 05/09/2024    LDLCALC 62 (L) 10/27/2023     Lab  "Results   Component Value Date    TRIG 131 10/15/2024    TRIG 142 05/09/2024    TRIG 99 10/27/2023     No components found for: \"CHOLHDL\"  Lab Results   Component Value Date    HGBA1C 5.7 05/27/2022     Other labs not included in the list above were reviewed either before or during this encounter.    History    Past Medical History:   Diagnosis Date    Airway clearance impairment 08/26/2023    Angina pectoris 08/26/2023    Arthritis of hip 08/26/2023    Arthritis of right hip 08/26/2023    At risk for bleeding 08/26/2023    Atherosclerotic heart disease of native coronary artery with other forms of angina pectoris 03/18/2019    Benign prostatic hyperplasia without urinary obstruction 03/18/2019    Bilateral sensorineural hearing loss 08/26/2023    Chronic rhinitis 05/06/2024    Congestive heart failure 11/19/2023    Coronary arteriosclerosis 03/18/2019    Depression 08/26/2023    Depressive disorder 03/18/2019    Essential hypertension 08/26/2023    External hemorrhoids 08/26/2023    Gastroesophageal reflux disease 08/26/2023    Gout 08/26/2023    History of lung cancer 10/26/2023    History of malignant neoplasm 05/06/2024    History of thromboembolism of vein 08/26/2023    History of venous thromboembolism 08/26/2023    Hypertension 09/13/2022    Hypothyroidism 08/26/2023    Impaired gas exchange 08/26/2023    Kidney stone 08/26/2023    Lung cancer (Multi) 2012    nonsmall cell    Malignant neoplasm of lung (Multi) 03/18/2019    chemo and radiation, remission for 6 years    Mixed hyperlipidemia 08/26/2023    Neuropathy 08/26/2023    Overactive bladder 08/26/2023    Personal history of malignant neoplasm, unspecified     History of cancer    Primary malignant neoplasm of lung (Multi) 09/20/2012    Pulmonary embolism 2012    Pulmonary embolism 07/2024    Rash and other nonspecific skin eruption 09/13/2022    Sensorineural hearing loss (SNHL) of both ears 08/26/2023    Squamous cell carcinoma lung, right (Multi) " 2012    Stage 3 chronic kidney disease (Multi) 2023    Stage 3a chronic kidney disease (CKD) (Multi) 2023    Wax in ear 2024     Past Surgical History:   Procedure Laterality Date    CARDIAC CATHETERIZATION      COLONOSCOPY      CT GUIDED IMAGING FOR NEEDLE PLACEMENT  2012    CT GUIDED IMAGING FOR NEEDLE PLACEMENT [facility]    OTHER SURGICAL HISTORY  2022    Hip replacement    TOTAL HIP ARTHROPLASTY Left      Family History   Problem Relation Name Age of Onset    Hypothyroidism Daughter       Social History     Socioeconomic History    Marital status:      Spouse name: Not on file    Number of children: Not on file    Years of education: Not on file    Highest education level: Not on file   Occupational History    Not on file   Tobacco Use    Smoking status: Former     Current packs/day: 0.00     Types: Cigarettes     Quit date:      Years since quittin.0     Passive exposure: Past    Smokeless tobacco: Never   Vaping Use    Vaping status: Never Used   Substance and Sexual Activity    Alcohol use: Not Currently     Alcohol/week: 6.0 standard drinks of alcohol     Types: 6 Standard drinks or equivalent per week    Drug use: Never    Sexual activity: Never   Other Topics Concern    Not on file   Social History Narrative    Not on file     Social Drivers of Health     Financial Resource Strain: Not on file   Food Insecurity: Not on file   Transportation Needs: Not on file   Physical Activity: Not on file   Stress: Not on file   Social Connections: Not on file   Intimate Partner Violence: Not on file   Housing Stability: Not on file     No Known Allergies  Current Outpatient Medications on File Prior to Visit   Medication Sig Dispense Refill    allopurinol (Zyloprim) 100 mg tablet Take 1 tablet (100 mg) by mouth early in the morning.. 90 tablet 3    levothyroxine (Synthroid, Levoxyl) 50 mcg tablet Take 1 tablet (50 mcg) by mouth once daily. 90 tablet  3    multivitamin with minerals (multivitamin with folic acid) tablet Take 1 tablet by mouth once daily.  Additional Instructions: SUPPLEMENT      omeprazole (PriLOSEC) 40 mg DR capsule Take 1 capsule (40 mg) by mouth once daily.  Additional Instructions: REFLUX      oxybutynin XL (Ditropan-XL) 10 mg 24 hr tablet Take 1 tablet (10 mg) by mouth once daily.      rivaroxaban (Xarelto) 20 mg tablet Take 1 tablet (20 mg) by mouth once daily. Take with food.      rosuvastatin (Crestor) 20 mg tablet Take 1 tablet (20 mg) by mouth once daily. 90 tablet 3    valsartan (Diovan) 80 mg tablet Take 1 tablet (80 mg) by mouth once daily. 90 tablet 3    [DISCONTINUED] azithromycin (Zithromax) 250 mg tablet  (Patient not taking: Reported on 1/24/2025)      [DISCONTINUED] benzonatate (Tessalon) 100 mg capsule TAKE 1 CAPSULE BY MOUTH THREE TIMES DAILY FOR UP TO 5 DAYS AS NEEDED FOR COUGH (Patient not taking: Reported on 1/24/2025)       No current facility-administered medications on file prior to visit.     Immunization History   Administered Date(s) Administered    Flu vaccine, quadrivalent, high-dose, preservative free, age 65y+ (FLUZONE) 09/13/2020, 10/01/2020, 09/25/2021, 10/10/2022, 09/26/2023    Flu vaccine, trivalent, preservative free, HIGH-DOSE, age 65y+ (Fluzone) 10/02/2017, 10/02/2018, 10/04/2019    Influenza, injectable, quadrivalent 09/23/2016    Influenza, seasonal, injectable 11/02/2013, 10/01/2014, 10/06/2015    Moderna COVID-19 vaccine, 12 years and older (50mcg/0.5mL)(Spikevax) 10/29/2023    Moderna COVID-19 vaccine, bivalent, blue cap/gray label *Check age/dose* 12/06/2022, 06/16/2023    Moderna SARS-CoV-2 Vaccination 01/15/2021, 02/01/2021, 02/16/2021, 03/02/2021, 11/04/2021, 08/16/2022    Pneumococcal conjugate vaccine, 13-valent (PREVNAR 13) 05/08/2015    Pneumococcal polysaccharide vaccine, 23-valent, age 2 years and older (PNEUMOVAX 23) 05/02/2014, 11/06/2020    RSV, 60 Years And Older (AREXVY) 10/29/2023     Tdap vaccine, age 7 year and older (BOOSTRIX, ADACEL) 10/04/2017    Zoster vaccine, recombinant, adult (SHINGRIX) 01/05/2018, 04/08/2018, 07/20/2018, 05/01/2019    Zoster, live 01/02/2012     Patient's medical history was reviewed and updated either before or during this encounter.       Bogdan Busby MD

## 2025-02-03 DIAGNOSIS — E03.8 OTHER SPECIFIED HYPOTHYROIDISM: ICD-10-CM

## 2025-02-03 RX ORDER — LEVOTHYROXINE SODIUM 50 UG/1
50 TABLET ORAL DAILY
Qty: 90 TABLET | Refills: 3 | Status: SHIPPED | OUTPATIENT
Start: 2025-02-03 | End: 2026-02-03

## 2025-03-26 ENCOUNTER — APPOINTMENT (OUTPATIENT)
Dept: OTOLARYNGOLOGY | Facility: CLINIC | Age: 85
End: 2025-03-26
Payer: MEDICARE

## 2025-04-07 ENCOUNTER — APPOINTMENT (OUTPATIENT)
Dept: OTOLARYNGOLOGY | Facility: CLINIC | Age: 85
End: 2025-04-07
Payer: MEDICARE

## 2025-04-07 VITALS — BODY MASS INDEX: 31.07 KG/M2 | WEIGHT: 217 LBS | HEIGHT: 70 IN

## 2025-04-07 DIAGNOSIS — K21.9 LARYNGOPHARYNGEAL REFLUX: ICD-10-CM

## 2025-04-07 DIAGNOSIS — H61.20 CERUMEN IN AUDITORY CANAL ON EXAMINATION: Primary | ICD-10-CM

## 2025-04-07 PROCEDURE — 1036F TOBACCO NON-USER: CPT | Performed by: OTOLARYNGOLOGY

## 2025-04-07 PROCEDURE — 99213 OFFICE O/P EST LOW 20 MIN: CPT | Performed by: OTOLARYNGOLOGY

## 2025-04-07 PROCEDURE — 1159F MED LIST DOCD IN RCRD: CPT | Performed by: OTOLARYNGOLOGY

## 2025-04-07 PROCEDURE — 1160F RVW MEDS BY RX/DR IN RCRD: CPT | Performed by: OTOLARYNGOLOGY

## 2025-04-07 NOTE — PROGRESS NOTES
HPI  Casey Hicks is a 84 y.o. male here for routine right-sided cerumen management.  Wears hearing aids bilaterally and does well with them.  History of reflux and takes antiacid with good effect.  Call us for refills when necessary.  History of sensorineural hearing loss from treatment of lung cancer and chemotherapy around 11 years ago.  He does not have any specific complaints today.      Past Medical History:   Diagnosis Date    Airway clearance impairment 08/26/2023    Angina pectoris 08/26/2023    Arthritis of hip 08/26/2023    Arthritis of right hip 08/26/2023    At risk for bleeding 08/26/2023    Atherosclerotic heart disease of native coronary artery with other forms of angina pectoris 03/18/2019    Benign prostatic hyperplasia without urinary obstruction 03/18/2019    Bilateral sensorineural hearing loss 08/26/2023    Chronic rhinitis 05/06/2024    Congestive heart failure 11/19/2023    Coronary arteriosclerosis 03/18/2019    Depression 08/26/2023    Depressive disorder 03/18/2019    Essential hypertension 08/26/2023    External hemorrhoids 08/26/2023    Gastroesophageal reflux disease 08/26/2023    Gout 08/26/2023    History of lung cancer 10/26/2023    History of malignant neoplasm 05/06/2024    History of thromboembolism of vein 08/26/2023    History of venous thromboembolism 08/26/2023    Hypertension 09/13/2022    Hypothyroidism 08/26/2023    Impaired gas exchange 08/26/2023    Kidney stone 08/26/2023    Lung cancer (Multi) 2012    nonsmall cell    Malignant neoplasm of lung (Multi) 03/18/2019    chemo and radiation, remission for 6 years    Mixed hyperlipidemia 08/26/2023    Neuropathy 08/26/2023    Overactive bladder 08/26/2023    Personal history of malignant neoplasm, unspecified     History of cancer    Primary malignant neoplasm of lung (Multi) 09/20/2012    Pulmonary embolism 2012    Pulmonary embolism 07/2024    Rash and other nonspecific skin eruption 09/13/2022    Sensorineural  "hearing loss (SNHL) of both ears 08/26/2023    Squamous cell carcinoma lung, 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    Stage 3 chronic kidney disease (Multi) 11/19/2023    Stage 3a chronic kidney disease (CKD) (Multi) 11/19/2023    Wax in ear 05/06/2024            Medications:     Current Outpatient Medications:     allopurinol (Zyloprim) 100 mg tablet, Take 1 tablet (100 mg) by mouth early in the morning.., Disp: 90 tablet, Rfl: 3    levothyroxine (Synthroid, Levoxyl) 50 mcg tablet, Take 1 tablet (50 mcg) by mouth once daily., Disp: 90 tablet, Rfl: 3    multivitamin with minerals (multivitamin with folic acid) tablet, Take 1 tablet by mouth once daily.  Additional Instructions: SUPPLEMENT, Disp: , Rfl:     omeprazole (PriLOSEC) 40 mg DR capsule, Take 1 capsule (40 mg) by mouth once daily.  Additional Instructions: REFLUX, Disp: , Rfl:     oxybutynin XL (Ditropan-XL) 10 mg 24 hr tablet, Take 1 tablet (10 mg) by mouth once daily., Disp: , Rfl:     rivaroxaban (Xarelto) 20 mg tablet, Take 1 tablet (20 mg) by mouth once daily. Take with food., Disp: , Rfl:     rosuvastatin (Crestor) 20 mg tablet, Take 1 tablet (20 mg) by mouth once daily., Disp: 90 tablet, Rfl: 3    valsartan (Diovan) 80 mg tablet, Take 1 tablet (80 mg) by mouth once daily., Disp: 90 tablet, Rfl: 3     Allergies:  No Known Allergies     Physical Exam:  Last Recorded Vitals  Height 1.778 m (5' 10\"), weight 98.4 kg (217 lb).  General:     General appearance: Well-developed, well-nourished in no acute distress.       Voice:  normal       Head/face: Normal appearance; nontender to palpation     Facial nerve function: Normal and symmetric bilaterally.    Oral/oropharynx:     Oral vestibule: Normal labial and gingival mucosa     Tongue/floor of mouth: Normal without lesion     Oropharynx: Clear.  No lesions present of the hard/soft palate, posterior pharynx    Neck:     Neck: Normal appearance, trachea midline     Salivary glands: Normal to palpation bilaterally     Lymph nodes: " No cervical lymphadenopathy to palpation     Thyroid: No thyromegaly.  No palpable nodules     Range of motion: Normal    Neurological:     Cortical functions: Alert and oriented x3, appropriate affect       Larynx/hypopharynx:     Laryngeal findings: Mirror exam inadequate or limited secondary to enlarged base of tongue and/or excessive gagging    Ear:     Ear canal: Normal bilaterally after suctioning dense cerumen impaction on the right and again minimal cerumen on the left     Tympanic membrane: Intact and mobile bilaterally     Pinna: Normal bilaterally     Hearing:  Gross hearing assessment normal by voice    Nose:     Visualized using: Anterior rhinoscopy     Nasopharynx: Inadequate mirror exam secondary to gag, anatomy.       Nasal dorsum: Nontraumatic midline appearance     Septum: Midline     Inferior turbinates: Normally sized     Mucosa: Bilateral, pink, normal appearing       Assessment/Plan   He will continue his NS regimen.  Ears cleaned.  He does not notice much subjective relief and will continue to wear his hearing aids.  Recheck 6 months, sooner as needed         Alexandre Joyner MD

## 2025-04-25 ENCOUNTER — HOSPITAL ENCOUNTER (OUTPATIENT)
Dept: RADIOLOGY | Facility: CLINIC | Age: 85
Discharge: HOME | End: 2025-04-25
Payer: MEDICARE

## 2025-04-25 ENCOUNTER — OFFICE VISIT (OUTPATIENT)
Dept: PRIMARY CARE | Facility: CLINIC | Age: 85
End: 2025-04-25
Payer: MEDICARE

## 2025-04-25 VITALS
TEMPERATURE: 97.4 F | HEIGHT: 70 IN | OXYGEN SATURATION: 96 % | HEART RATE: 76 BPM | WEIGHT: 223 LBS | DIASTOLIC BLOOD PRESSURE: 80 MMHG | BODY MASS INDEX: 31.92 KG/M2 | SYSTOLIC BLOOD PRESSURE: 128 MMHG

## 2025-04-25 DIAGNOSIS — E03.9 ACQUIRED HYPOTHYROIDISM: ICD-10-CM

## 2025-04-25 DIAGNOSIS — N32.81 OVERACTIVE BLADDER: ICD-10-CM

## 2025-04-25 DIAGNOSIS — I25.118 ATHEROSCLEROTIC HEART DISEASE OF NATIVE CORONARY ARTERY WITH OTHER FORMS OF ANGINA PECTORIS: ICD-10-CM

## 2025-04-25 DIAGNOSIS — E79.0 HYPERURICEMIA: ICD-10-CM

## 2025-04-25 DIAGNOSIS — I50.9 CONGESTIVE HEART FAILURE, UNSPECIFIED HF CHRONICITY, UNSPECIFIED HEART FAILURE TYPE: ICD-10-CM

## 2025-04-25 DIAGNOSIS — N18.31 STAGE 3A CHRONIC KIDNEY DISEASE (CKD) (MULTI): ICD-10-CM

## 2025-04-25 DIAGNOSIS — J40 BRONCHITIS: ICD-10-CM

## 2025-04-25 DIAGNOSIS — N40.0 BPH WITHOUT URINARY OBSTRUCTION: ICD-10-CM

## 2025-04-25 DIAGNOSIS — M1A.9XX0 CHRONIC GOUT WITHOUT TOPHUS, UNSPECIFIED CAUSE, UNSPECIFIED SITE: ICD-10-CM

## 2025-04-25 DIAGNOSIS — I10 ESSENTIAL HYPERTENSION: Primary | ICD-10-CM

## 2025-04-25 DIAGNOSIS — K21.9 GASTROESOPHAGEAL REFLUX DISEASE WITHOUT ESOPHAGITIS: ICD-10-CM

## 2025-04-25 DIAGNOSIS — F32.A DEPRESSION, UNSPECIFIED DEPRESSION TYPE: ICD-10-CM

## 2025-04-25 DIAGNOSIS — I26.99 PULMONARY EMBOLISM WITHOUT ACUTE COR PULMONALE, UNSPECIFIED CHRONICITY, UNSPECIFIED PULMONARY EMBOLISM TYPE (MULTI): ICD-10-CM

## 2025-04-25 PROBLEM — C34.90 MALIGNANT NEOPLASM OF LUNG (MULTI): Status: RESOLVED | Noted: 2019-03-18 | Resolved: 2025-04-25

## 2025-04-25 PROBLEM — C34.90 PRIMARY MALIGNANT NEOPLASM OF LUNG (MULTI): Status: RESOLVED | Noted: 2022-09-13 | Resolved: 2025-04-25

## 2025-04-25 PROCEDURE — 99214 OFFICE O/P EST MOD 30 MIN: CPT | Performed by: INTERNAL MEDICINE

## 2025-04-25 PROCEDURE — 1036F TOBACCO NON-USER: CPT | Performed by: INTERNAL MEDICINE

## 2025-04-25 PROCEDURE — 1159F MED LIST DOCD IN RCRD: CPT | Performed by: INTERNAL MEDICINE

## 2025-04-25 PROCEDURE — G2211 COMPLEX E/M VISIT ADD ON: HCPCS | Performed by: INTERNAL MEDICINE

## 2025-04-25 PROCEDURE — 3074F SYST BP LT 130 MM HG: CPT | Performed by: INTERNAL MEDICINE

## 2025-04-25 PROCEDURE — 1126F AMNT PAIN NOTED NONE PRSNT: CPT | Performed by: INTERNAL MEDICINE

## 2025-04-25 PROCEDURE — 3079F DIAST BP 80-89 MM HG: CPT | Performed by: INTERNAL MEDICINE

## 2025-04-25 PROCEDURE — 1160F RVW MEDS BY RX/DR IN RCRD: CPT | Performed by: INTERNAL MEDICINE

## 2025-04-25 PROCEDURE — 71046 X-RAY EXAM CHEST 2 VIEWS: CPT

## 2025-04-25 RX ORDER — OXYBUTYNIN CHLORIDE 10 MG/1
10 TABLET, EXTENDED RELEASE ORAL DAILY
Qty: 90 TABLET | Refills: 3 | Status: SHIPPED | OUTPATIENT
Start: 2025-04-25 | End: 2026-04-25

## 2025-04-25 RX ORDER — VIBEGRON 75 MG/1
75 TABLET, FILM COATED ORAL DAILY
COMMUNITY
End: 2025-04-25 | Stop reason: ALTCHOICE

## 2025-04-25 RX ORDER — OMEPRAZOLE 40 MG/1
40 CAPSULE, DELAYED RELEASE ORAL DAILY
COMMUNITY

## 2025-04-25 RX ORDER — AMOXICILLIN AND CLAVULANATE POTASSIUM 875; 125 MG/1; MG/1
875 TABLET, FILM COATED ORAL 2 TIMES DAILY
Qty: 14 TABLET | Refills: 0 | Status: SHIPPED | OUTPATIENT
Start: 2025-04-25 | End: 2025-05-02

## 2025-04-25 RX ORDER — TORSEMIDE 5 MG/1
5 TABLET ORAL DAILY
COMMUNITY

## 2025-04-25 RX ORDER — BENZONATATE 100 MG/1
100 CAPSULE ORAL 3 TIMES DAILY PRN
Qty: 42 CAPSULE | Refills: 0 | Status: SHIPPED | OUTPATIENT
Start: 2025-04-25 | End: 2025-05-25

## 2025-04-25 ASSESSMENT — LIFESTYLE VARIABLES
HOW OFTEN DURING THE LAST YEAR HAVE YOU BEEN UNABLE TO REMEMBER WHAT HAPPENED THE NIGHT BEFORE BECAUSE YOU HAD BEEN DRINKING: NEVER
HOW OFTEN DURING THE LAST YEAR HAVE YOU FOUND THAT YOU WERE NOT ABLE TO STOP DRINKING ONCE YOU HAD STARTED: NEVER
HOW OFTEN DO YOU HAVE A DRINK CONTAINING ALCOHOL: NEVER
HOW OFTEN DURING THE LAST YEAR HAVE YOU FAILED TO DO WHAT WAS NORMALLY EXPECTED FROM YOU BECAUSE OF DRINKING: NEVER
HOW MANY STANDARD DRINKS CONTAINING ALCOHOL DO YOU HAVE ON A TYPICAL DAY: PATIENT DOES NOT DRINK
SKIP TO QUESTIONS 9-10: 1
HAVE YOU OR SOMEONE ELSE BEEN INJURED AS A RESULT OF YOUR DRINKING: NO
AUDIT-C TOTAL SCORE: 0
HOW OFTEN DURING THE LAST YEAR HAVE YOU HAD A FEELING OF GUILT OR REMORSE AFTER DRINKING: NEVER
HOW OFTEN DO YOU HAVE SIX OR MORE DRINKS ON ONE OCCASION: NEVER
HAS A RELATIVE, FRIEND, DOCTOR, OR ANOTHER HEALTH PROFESSIONAL EXPRESSED CONCERN ABOUT YOUR DRINKING OR SUGGESTED YOU CUT DOWN: NO
HOW OFTEN DURING THE LAST YEAR HAVE YOU NEEDED AN ALCOHOLIC DRINK FIRST THING IN THE MORNING TO GET YOURSELF GOING AFTER A NIGHT OF HEAVY DRINKING: NEVER
AUDIT TOTAL SCORE: 0

## 2025-04-25 ASSESSMENT — ENCOUNTER SYMPTOMS
DEPRESSION: 0
SHORTNESS OF BREATH: 0
LOSS OF SENSATION IN FEET: 0
OCCASIONAL FEELINGS OF UNSTEADINESS: 0
COUGH: 1
CARDIOVASCULAR NEGATIVE: 1
GASTROINTESTINAL NEGATIVE: 1
WHEEZING: 0

## 2025-04-25 ASSESSMENT — PAIN SCALES - GENERAL: PAINLEVEL_OUTOF10: 0-NO PAIN

## 2025-04-25 ASSESSMENT — PATIENT HEALTH QUESTIONNAIRE - PHQ9
2. FEELING DOWN, DEPRESSED OR HOPELESS: NOT AT ALL
1. LITTLE INTEREST OR PLEASURE IN DOING THINGS: NOT AT ALL
SUM OF ALL RESPONSES TO PHQ9 QUESTIONS 1 AND 2: 0

## 2025-04-25 NOTE — PROGRESS NOTES
[facility] [place]: [facility]  PROGRESS NOTE      Joseph Hicks is a 84 y.o. male that is presenting today for Follow-up.    Assessment/Plan   Diagnoses and all orders for this visit:  Essential hypertension  Atherosclerotic heart disease of native coronary artery with other forms of angina pectoris  -     Lipid Panel; Future  Pulmonary embolism without acute cor pulmonale, unspecified chronicity, unspecified pulmonary embolism type (Multi)  BPH without urinary obstruction  Stage 3a chronic kidney disease (CKD) (Multi)  -     CBC and Auto Differential; Future  -     Comprehensive Metabolic Panel; Future  Congestive heart failure, unspecified HF chronicity, unspecified heart failure type  Overactive bladder  -     oxybutynin XL (Ditropan-XL) 10 mg 24 hr tablet; Take 1 tablet (10 mg) by mouth once daily.  Depression, unspecified depression type  Gastroesophageal reflux disease without esophagitis  Chronic gout without tophus, unspecified cause, unspecified site  Acquired hypothyroidism  -     TSH with reflex to Free T4 if abnormal; Future  Hyperuricemia  -     Uric Acid; Future  Bronchitis  -     XR chest 2 views; Future  -     amoxicillin-clavulanate (Augmentin) 875-125 mg tablet; Take 1 tablet (875 mg) by mouth 2 times a day for 7 days.  -     benzonatate (Tessalon) 100 mg capsule; Take 1 capsule (100 mg) by mouth 3 times a day as needed for cough. Do not crush or chew.  Other orders  -     Follow Up In Primary Care - Established  -     Follow Up In Primary Care - Established; Future  -     Follow Up In Primary Care - Medicare Annual; Future  We considered the current situation.  1.  Cough-this may just be a bronchitis but I would like to do a chest x-ray and am going to give him some antibiotics given his change in lung sounds.  2.  Overactive bladder-patient thought the oxybutynin worked just as well and is less expensive we will send that in for him.  3.  History of  hypothyroidism-clinically and chemically euthyroid we will check a TSH next time  4.  History of elevated uric acid-currently doing well without the allopurinol we will check his levels with his next labs  5.  CKD 3A-stable  6.  Pulmonary embolism-doing well with his anticoagulants.    I will plan on seeing him back in 3 months  Subjective   This 84-year-old is here for a 3-month follow-up appointment.  At his last visit we added some physical therapy to help him with his balance and strength and he thinks this has been somewhat helpful for him.  Unfortunately he has not been feeling that great over the last week has had a productive cough without shortness of breath fevers chills or sweats.    There has been no incidence of gout.  There have been no falls there have been no bleeding issues      Review of Systems   Respiratory:  Positive for cough. Negative for shortness of breath and wheezing.    Cardiovascular: Negative.    Gastrointestinal: Negative.    Genitourinary: Negative.       Objective   Vitals:    04/25/25 0807   BP: 128/80   Pulse: 76   Temp: 36.3 °C (97.4 °F)   SpO2: 96%      Body mass index is 32 kg/m².  Physical Exam  Cardiovascular:      Rate and Rhythm: Normal rate and regular rhythm.      Pulses: Normal pulses.      Heart sounds: Normal heart sounds.   Pulmonary:      Effort: Pulmonary effort is normal.      Breath sounds: Rhonchi present.      Comments: There are some rhonchi in the right base that cleared with cough  Abdominal:      General: Abdomen is flat. Bowel sounds are normal.      Palpations: Abdomen is soft.       Diagnostic Results   Lab Results   Component Value Date    GLUCOSE 92 01/24/2025    CALCIUM 10.1 01/24/2025     01/24/2025    K 4.9 01/24/2025    CO2 28 01/24/2025     01/24/2025    BUN 17 01/24/2025    CREATININE 1.29 01/24/2025     Lab Results   Component Value Date    ALT 17 01/24/2025    AST 24 01/24/2025    ALKPHOS 83 01/24/2025    BILITOT 1.5 (H) 01/24/2025  "    Lab Results   Component Value Date    WBC 5.5 2025    HGB 16.7 2025    HCT 50.8 2025     (H) 2025     2025     Lab Results   Component Value Date    CHOL 139 2025    CHOL 235 (H) 10/15/2024    CHOL 165 2024     Lab Results   Component Value Date    HDL 69.1 2025    HDL 59.9 10/15/2024    HDL 73.0 2024     Lab Results   Component Value Date    LDLCALC 47 2025    LDLCALC 149 (H) 10/15/2024    LDLCALC 64 (L) 2024     Lab Results   Component Value Date    TRIG 115 2025    TRIG 131 10/15/2024    TRIG 142 2024     No components found for: \"CHOLHDL\"  Lab Results   Component Value Date    HGBA1C 5.7 2022     Other labs not included in the list above were reviewed either before or during this encounter.    History    Medical History[1]  Surgical History[2]  Family History[3]  Social History     Socioeconomic History    Marital status:      Spouse name: Not on file    Number of children: Not on file    Years of education: Not on file    Highest education level: Not on file   Occupational History    Not on file   Tobacco Use    Smoking status: Former     Current packs/day: 0.00     Types: Cigarettes     Quit date:      Years since quittin.3     Passive exposure: Past    Smokeless tobacco: Never   Vaping Use    Vaping status: Never Used   Substance and Sexual Activity    Alcohol use: Not Currently     Alcohol/week: 6.0 standard drinks of alcohol     Types: 6 Standard drinks or equivalent per week    Drug use: Never    Sexual activity: Never   Other Topics Concern    Not on file   Social History Narrative    Not on file     Social Drivers of Health     Financial Resource Strain: Not on file   Food Insecurity: Not on file   Transportation Needs: Not on file   Physical Activity: Not on file   Stress: Not on file   Social Connections: Not on file   Intimate Partner Violence: Not on file   Housing Stability: Not on " file     Allergies[4]  Medications Ordered Prior to Encounter[5]  Immunization History   Administered Date(s) Administered    Flu vaccine, quadrivalent, high-dose, preservative free, age 65y+ (FLUZONE) 09/13/2020, 10/01/2020, 09/25/2021, 10/10/2022, 09/26/2023    Flu vaccine, trivalent, preservative free, HIGH-DOSE, age 65y+ (Fluzone) 10/02/2017, 10/02/2018, 10/04/2019    Influenza, injectable, quadrivalent 09/23/2016    Influenza, seasonal, injectable 11/02/2013, 10/01/2014, 10/06/2015    Moderna COVID-19 vaccine, 12 years and older (50mcg/0.5mL)(Spikevax) 10/29/2023    Moderna COVID-19 vaccine, bivalent, blue cap/gray label *Check age/dose* 12/06/2022, 06/16/2023    Moderna SARS-CoV-2 Vaccination 01/15/2021, 02/01/2021, 02/16/2021, 03/02/2021, 11/04/2021, 08/16/2022    Pneumococcal conjugate vaccine, 13-valent (PREVNAR 13) 05/08/2015    Pneumococcal polysaccharide vaccine, 23-valent, age 2 years and older (PNEUMOVAX 23) 05/02/2014, 11/06/2020    RSV, 60 Years And Older (AREXVY) 10/29/2023    Tdap vaccine, age 7 year and older (BOOSTRIX, ADACEL) 10/04/2017    Zoster vaccine, recombinant, adult (SHINGRIX) 01/05/2018, 04/08/2018, 07/20/2018, 05/01/2019    Zoster, live 01/02/2012     Patient's medical history was reviewed and updated either before or during this encounter.       Bogdan Busby MD       [1]   Past Medical History:  Diagnosis Date    Airway clearance impairment 08/26/2023    Angina pectoris 08/26/2023    Arthritis of hip 08/26/2023    Arthritis of right hip 08/26/2023    At risk for bleeding 08/26/2023    Atherosclerotic heart disease of native coronary artery with other forms of angina pectoris 03/18/2019    Benign prostatic hyperplasia without urinary obstruction 03/18/2019    Bilateral sensorineural hearing loss 08/26/2023    Chronic rhinitis 05/06/2024    Congestive heart failure 11/19/2023    Coronary arteriosclerosis 03/18/2019    Depression 08/26/2023    Depressive disorder 03/18/2019     Essential hypertension 08/26/2023    External hemorrhoids 08/26/2023    Gastroesophageal reflux disease 08/26/2023    Gout 08/26/2023    History of lung cancer 10/26/2023    History of malignant neoplasm 05/06/2024    History of thromboembolism of vein 08/26/2023    History of venous thromboembolism 08/26/2023    Hypertension 09/13/2022    Hypothyroidism 08/26/2023    Impaired gas exchange 08/26/2023    Kidney stone 08/26/2023    Lung cancer (Multi) 2012    nonsmall cell    Malignant neoplasm of lung (Multi) 03/18/2019    chemo and radiation, remission for 6 years    Mixed hyperlipidemia 08/26/2023    Neuropathy 08/26/2023    Overactive bladder 08/26/2023    Personal history of malignant neoplasm, unspecified     History of cancer    Primary malignant neoplasm of lung (Multi) 09/20/2012    Pulmonary embolism 2012    Pulmonary embolism 07/2024    Rash and other nonspecific skin eruption 09/13/2022    Sensorineural hearing loss (SNHL) of both ears 08/26/2023    Squamous cell carcinoma lung, 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    Stage 3 chronic kidney disease (Multi) 11/19/2023    Stage 3a chronic kidney disease (CKD) (Multi) 11/19/2023    Wax in ear 05/06/2024   [2]   Past Surgical History:  Procedure Laterality Date    CARDIAC CATHETERIZATION  2013    COLONOSCOPY  2013    CT GUIDED IMAGING FOR NEEDLE PLACEMENT  09/26/2012    CT GUIDED IMAGING FOR NEEDLE PLACEMENT [facility]    OTHER SURGICAL HISTORY  08/24/2022    Hip replacement    TOTAL HIP ARTHROPLASTY Left    [3]   Family History  Problem Relation Name Age of Onset    Hypothyroidism Daughter     [4] No Known Allergies  [5]   Current Outpatient Medications on File Prior to Visit   Medication Sig Dispense Refill    levothyroxine (Synthroid, Levoxyl) 50 mcg tablet Take 1 tablet (50 mcg) by mouth once daily. 90 tablet 3    multivitamin with minerals (multivitamin with folic acid) tablet Take 1 tablet by mouth once daily.  Additional Instructions: SUPPLEMENT      omeprazole  (PriLOSEC) 40 mg DR capsule Take 1 capsule (40 mg) by mouth early in the morning.. Do not crush or chew.      rivaroxaban (Xarelto) 20 mg tablet Take 1 tablet (20 mg) by mouth once daily. Take with food.      rosuvastatin (Crestor) 20 mg tablet Take 1 tablet (20 mg) by mouth once daily. 90 tablet 3    torsemide (Demadex) 5 mg tablet Take 1 tablet (5 mg) by mouth once daily.      valsartan (Diovan) 80 mg tablet Take 1 tablet (80 mg) by mouth once daily. 90 tablet 3    [DISCONTINUED] vibegron (Gemtesa) 75 mg tablet Take 1 tablet (75 mg) by mouth early in the morning..      [DISCONTINUED] allopurinol (Zyloprim) 100 mg tablet Take 1 tablet (100 mg) by mouth early in the morning.. (Patient not taking: Reported on 4/25/2025) 90 tablet 3    [DISCONTINUED] omeprazole (PriLOSEC) 40 mg DR capsule Take 1 capsule (40 mg) by mouth once daily.  Additional Instructions: REFLUX      [DISCONTINUED] oxybutynin XL (Ditropan-XL) 10 mg 24 hr tablet Take 1 tablet (10 mg) by mouth once daily. (Patient not taking: Reported on 4/25/2025)       No current facility-administered medications on file prior to visit.

## 2025-04-25 NOTE — PATIENT INSTRUCTIONS
Casey-  I am a little concerned about this cough.  I am going to get a chest x-ray on you I am going to send in some antibiotics to your pharmacy I ordered Augmentin that you can take twice a day and also a cough medicine called Tessalon Perles that you can use up to 3 times a day as you need to for cough.    Otherwise we will leave your other medicines the same with the other only other exception being you can go back to the oxybutynin and I sent a new prescription into the pharmacy.    I will plan on seeing you back in 3 months I did order a blood test but you can do it just prior to that visit.

## 2025-05-13 ENCOUNTER — DOCUMENTATION (OUTPATIENT)
Dept: AUDIOLOGY | Facility: CLINIC | Age: 85
End: 2025-05-13
Payer: MEDICARE

## 2025-05-13 ENCOUNTER — NURSE TRIAGE (OUTPATIENT)
Dept: AUDIOLOGY | Facility: CLINIC | Age: 85
End: 2025-05-13
Payer: MEDICARE

## 2025-05-13 NOTE — PROGRESS NOTES
AUDIOLOGY ADULT HEARING AID  REPAIR    Name:  Joseph Hicks  :  1940  Age:  84 y.o.  Date of Service:  May 13, 2025    Reason for visit: Mr. Hicks is seen in the clinic today by audiology assistant for a hearing aid repair appointment. Patient complains that he is hearing well when streaming phone calls however his own voice sounds strange and he is not hearing the voices around him.     Hearing aid repair: Listening check was weak for both hearing aids. Receivers were both replaced and sound was immediately improved. Microphones were brushed and vacuumed as well. Mr. Hicks felt sound had improved immensely.

## 2025-05-13 NOTE — TELEPHONE ENCOUNTER
Hearing aids are streaming well but are not amplifying external sound. This issue began 3 days ago. His voice sounds strange as well.     Per Phonak audiology check microphones and call if cleaning microphones does not help.

## 2025-06-12 ENCOUNTER — DOCUMENTATION (OUTPATIENT)
Dept: AUDIOLOGY | Facility: CLINIC | Age: 85
End: 2025-06-12
Payer: MEDICARE

## 2025-06-12 NOTE — PROGRESS NOTES
AUDIOLOGY ADULT HEARING AID REPAIR    Name:  Joseph Hicks  :  1940  Age:  84 y.o.  Date of Service:  2025    Reason for visit: Mr. Hicks is seen in the clinic today by audiology assistant for a hearing aid repair appointment. Patient complains that his hearing aids are always disconnecting; he is able to reconnect by pressing R-Phonak (device under bluetooth). This disconnection happens intermittently a few times a week.     Phone software is up to date. Per Addis checked and all settings are correct. Phonak audiologist suggested deleting pairings, switching streaming side to left side, turning phone off then on again,  and then re-pairing phone to hearing aids.     While Casey was in office receivers and domes were replaced as well.     Casey will reconnect TV Streamer as changed streaming side may disconnect pairing.

## 2025-07-16 ENCOUNTER — TELEPHONE (OUTPATIENT)
Dept: AUDIOLOGY | Facility: CLINIC | Age: 85
End: 2025-07-16
Payer: MEDICARE

## 2025-07-18 DIAGNOSIS — E79.0 HYPERURICEMIA: ICD-10-CM

## 2025-07-18 DIAGNOSIS — E03.9 ACQUIRED HYPOTHYROIDISM: ICD-10-CM

## 2025-07-18 DIAGNOSIS — I25.118 ATHEROSCLEROTIC HEART DISEASE OF NATIVE CORONARY ARTERY WITH OTHER FORMS OF ANGINA PECTORIS: ICD-10-CM

## 2025-07-18 DIAGNOSIS — N18.31 STAGE 3A CHRONIC KIDNEY DISEASE (CKD) (MULTI): ICD-10-CM

## 2025-07-25 ENCOUNTER — APPOINTMENT (OUTPATIENT)
Dept: AUDIOLOGY | Facility: CLINIC | Age: 85
End: 2025-07-25
Payer: MEDICARE

## 2025-07-25 ENCOUNTER — CLINICAL SUPPORT (OUTPATIENT)
Dept: AUDIOLOGY | Facility: CLINIC | Age: 85
End: 2025-07-25
Payer: MEDICARE

## 2025-07-25 DIAGNOSIS — H90.3 SENSORINEURAL HEARING LOSS (SNHL) OF BOTH EARS: Primary | ICD-10-CM

## 2025-07-25 PROCEDURE — V5014 HEARING AID REPAIR/MODIFYING: HCPCS | Performed by: AUDIOLOGIST

## 2025-07-29 NOTE — PROGRESS NOTES
AUDIOLOGY ADULT AUDIOMETRIC EVALUATION    Name:  Joseph Hicks  :  1940 Age:  84 y.o.  Date of Evaluation:  2025    Reason for visit: Mr. Hicks is seen in the clinic today for an audiologic evaluation.     HISTORY  The patient has a history of bilateral sensorineural hearing loss and wears binaural hearing aids.       EVALUATION  See scanned audiogram: “Media” > “Audiology Report”.      RESULTS  Otoscopic Evaluation:  Right Ear: clear ear canal  Left Ear: clear ear canal    Immittance Measures:  Tympanometry:  Right Ear: Could not evaluate since an adequate seal could not be maintained    Left Ear: Type A, normal tympanic membrane mobility with normal middle ear pressure     Acoustic Reflexes:  Ipsilateral Right Ear: Could not evaluate since an adequate seal could not be maintained    Ipsilateral Left Ear: Could not evaluate since an adequate seal could not be maintained    Contralateral Right Ear: did not evaluate  Contralateral Left Ear: did not evaluate    Distortion Product Otoacoustic Emissions (DPOAEs):  Right Ear: did not evaluate   Left Ear: did not evaluate     Audiometry:  Test Technique and Reliability:   Standard audiometry via supra-aural headphones. Reliability is good.    Pure tone air and bone conduction audiometry:  Right Ear: moderately-severe to severe sensorineural hearing loss   Left Ear: moderate to severe sensorineural hearing loss     Speech Audiometry (Word Recognition Scores):   Right Ear: Poor, 64% in quiet at an elevated presentation level (presented with recorded speech)  Left Ear: Fair, 72% in quiet at an elevated presentation level (presented with recorded speech)    IMPRESSIONS  Results of today's audiometric evaluation revealed a bilateral sensorineural hearing loss.  Results for pure tone audiometry are essentially stable compared with the previous hearing evaluation from 2024.  There was a decrease in the patient's word recognition  ability.  Today's test words were presented via recorded speech.      RECOMMENDATIONS  - Annual audiologic evaluation, sooner if an acute change is noted.  - Continued hearing aid use.  - Follow-up with audiology annually for routine hearing aid maintenance, sooner if questions/problems arise.    PATIENT EDUCATION  Discussed results, impressions and recommendations with the patient. Questions were addressed and the patient was encouraged to contact our office should concerns arise.    Time for this encounter: 9:00-9:30    Raquel Wang M.A., CCC-A   Licensed Audiologist

## 2025-07-29 NOTE — PROGRESS NOTES
HEARING AID CHECK     RIGHT: PHONAK AUDEO I90 SPHERE RECHARGEABLE TRANG WITH A #2P  AND LARGE POWER DOME WITHOUT RETENTION TAIL  S.N.: [id number]  LEFT:  PHONAK AUDEO I90 SPHERE RECHARGEABLE TRANG WITH A #2P  AND LARGE POWER DOME WITHOUT RETENTION TAIL  S.N.: [id number]  WARRANTY EXPIRES: 10/20/2027  CERUSTOP WAX GUARDS     The patient was seen for a hearing aid check with the complaint that he is not hearing well with his hearing aids.  Otoscopy revealed clear ear canals bilaterally.  Tested his hearing and noted stable pure tone thresholds compared to his previous audiogram.  The patient's word recognition ability is now 64% in the right ear and 72% in the left ear.  Discussed the impact of this on his speech clarity with his hearing aids.  Put the new hearing thresholds in the hearing aid software.  Cleaned and dehumidified both hearing aids and vacuumed the microphones.  Replaced the receivers and domes.  The post cleaning listening check revealed good sound quality in both hearing aids.  Connected the hearing aids to the software and recalculated the fitting to accommodate any change in hearing.  Recalculated the fitting based on Phonak Digital Contrast fitting formula to see if this will help him.  Perfomed speechmapping and adjusted the hearing aids to approximate his targets for soft, moderate and loud speech inputs.  The patient will try these settings and return if he needs a further hearing aid adjustment.  He has not been using his Zi accessory.  Recommended that he schedule another appointment to go over his Zi and review his cell phone connection.  $65.00.      Appointment time:  9:30-10:15

## 2025-07-30 ENCOUNTER — APPOINTMENT (OUTPATIENT)
Dept: PRIMARY CARE | Facility: CLINIC | Age: 85
End: 2025-07-30
Payer: MEDICARE

## 2025-07-30 ENCOUNTER — APPOINTMENT (OUTPATIENT)
Dept: OTOLARYNGOLOGY | Facility: CLINIC | Age: 85
End: 2025-07-30
Payer: MEDICARE

## 2025-07-30 VITALS — TEMPERATURE: 97.3 F | WEIGHT: 222 LBS | BODY MASS INDEX: 31.78 KG/M2 | HEIGHT: 70 IN

## 2025-07-30 DIAGNOSIS — K21.9 LARYNGOPHARYNGEAL REFLUX: ICD-10-CM

## 2025-07-30 DIAGNOSIS — H61.20 CERUMEN IN AUDITORY CANAL ON EXAMINATION: Primary | ICD-10-CM

## 2025-07-30 PROCEDURE — 1159F MED LIST DOCD IN RCRD: CPT | Performed by: OTOLARYNGOLOGY

## 2025-07-30 PROCEDURE — 99213 OFFICE O/P EST LOW 20 MIN: CPT | Performed by: OTOLARYNGOLOGY

## 2025-07-30 NOTE — PROGRESS NOTES
HPI  Casey Hicks is a 84 y.o. male here for routine right-sided cerumen management.  Wears hearing aids bilaterally and does well with them.  History of reflux and takes antiacid with good effect.  Call us for refills when necessary.  History of sensorineural hearing loss from treatment of lung cancer and chemotherapy around 11 years ago.  He does not have any specific complaints today.      Past Medical History:   Diagnosis Date    Airway clearance impairment 08/26/2023    Angina pectoris 08/26/2023    Arthritis of hip 08/26/2023    Arthritis of right hip 08/26/2023    At risk for bleeding 08/26/2023    Atherosclerotic heart disease of native coronary artery with other forms of angina pectoris 03/18/2019    Benign prostatic hyperplasia without urinary obstruction 03/18/2019    Bilateral sensorineural hearing loss 08/26/2023    Chronic rhinitis 05/06/2024    Congestive heart failure 11/19/2023    Coronary arteriosclerosis 03/18/2019    Depression 08/26/2023    Depressive disorder 03/18/2019    Essential hypertension 08/26/2023    External hemorrhoids 08/26/2023    Gastroesophageal reflux disease 08/26/2023    Gout 08/26/2023    History of lung cancer 10/26/2023    History of malignant neoplasm 05/06/2024    History of thromboembolism of vein 08/26/2023    History of venous thromboembolism 08/26/2023    Hypertension 09/13/2022    Hypothyroidism 08/26/2023    Impaired gas exchange 08/26/2023    Kidney stone 08/26/2023    Lung cancer (Multi) 2012    nonsmall cell    Malignant neoplasm of lung (Multi) 03/18/2019    chemo and radiation, remission for 6 years    Mixed hyperlipidemia 08/26/2023    Neuropathy 08/26/2023    Overactive bladder 08/26/2023    Personal history of malignant neoplasm, unspecified     History of cancer    Primary malignant neoplasm of lung (Multi) 09/20/2012    Pulmonary embolism 2012    Pulmonary embolism 07/2024    Rash and other nonspecific skin eruption 09/13/2022    Sensorineural  "hearing loss (SNHL) of both ears 08/26/2023    Squamous cell carcinoma lung, 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  Additional Instructions: SUPPLEMENT, Disp: , Rfl:     omeprazole (PriLOSEC) 40 mg DR capsule, Take 1 capsule (40 mg) by mouth early in the morning.. Do not crush or chew., Disp: , Rfl:     oxybutynin XL (Ditropan-XL) 10 mg 24 hr tablet, Take 1 tablet (10 mg) by mouth once daily., Disp: 90 tablet, Rfl: 3    rivaroxaban (Xarelto) 20 mg tablet, Take 1 tablet (20 mg) by mouth once daily. Take with food., Disp: , Rfl:     rosuvastatin (Crestor) 20 mg tablet, Take 1 tablet (20 mg) by mouth once daily., Disp: 90 tablet, Rfl: 3    torsemide (Demadex) 5 mg tablet, Take 1 tablet (5 mg) by mouth once daily., Disp: , Rfl:     valsartan (Diovan) 80 mg tablet, Take 1 tablet (80 mg) by mouth once daily., Disp: 90 tablet, Rfl: 3     Allergies:  No Known Allergies     Physical Exam:  Last Recorded Vitals  Temperature 36.3 °C (97.3 °F), height 1.778 m (5' 10\"), weight 101 kg (222 lb).  General:     General appearance: Well-developed, well-nourished in no acute distress.       Voice:  normal       Head/face: Normal appearance; nontender to palpation     Facial nerve function: Normal and symmetric bilaterally.    Oral/oropharynx:     Oral vestibule: Normal labial and gingival mucosa     Tongue/floor of mouth: Normal without lesion     Oropharynx: Clear.  No lesions present of the hard/soft palate, posterior pharynx    Neck:     Neck: Normal appearance, trachea midline     Salivary glands: Normal to palpation bilaterally     " Lymph nodes: No cervical lymphadenopathy to palpation     Thyroid: No thyromegaly.  No palpable nodules     Range of motion: Normal    Neurological:     Cortical functions: Alert and oriented x3, appropriate affect       Larynx/hypopharynx:     Laryngeal findings: Mirror exam inadequate or limited secondary to enlarged base of tongue and/or excessive gagging    Ear:     Ear canal: Normal bilaterally after cleaning cerumen impaction on the right and again minimal cerumen on the left with wire loop     Tympanic membrane: Intact and mobile bilaterally     Pinna: Normal bilaterally     Hearing:  Gross hearing assessment normal by voice    Nose:     Visualized using: Anterior rhinoscopy     Nasopharynx: Inadequate mirror exam secondary to gag, anatomy.       Nasal dorsum: Nontraumatic midline appearance     Septum: Midline     Inferior turbinates: Normally sized     Mucosa: Bilateral, pink, normal appearing       Assessment/Plan   He will continue his antiacid regimen.  Ears cleaned.  He will continue to wear his hearing aids.  Recheck 6 months, sooner as needed         Alexandre Joyner MD

## 2025-08-01 ENCOUNTER — OFFICE VISIT (OUTPATIENT)
Dept: PRIMARY CARE | Facility: CLINIC | Age: 85
End: 2025-08-01
Payer: MEDICARE

## 2025-08-01 VITALS
SYSTOLIC BLOOD PRESSURE: 122 MMHG | WEIGHT: 225 LBS | OXYGEN SATURATION: 97 % | TEMPERATURE: 97.3 F | HEART RATE: 54 BPM | BODY MASS INDEX: 32.21 KG/M2 | HEIGHT: 70 IN | DIASTOLIC BLOOD PRESSURE: 76 MMHG

## 2025-08-01 DIAGNOSIS — L98.9 SKIN LESION: ICD-10-CM

## 2025-08-01 DIAGNOSIS — I10 ESSENTIAL HYPERTENSION: Primary | ICD-10-CM

## 2025-08-01 DIAGNOSIS — I50.9 CONGESTIVE HEART FAILURE, UNSPECIFIED HF CHRONICITY, UNSPECIFIED HEART FAILURE TYPE: ICD-10-CM

## 2025-08-01 DIAGNOSIS — K21.9 GASTROESOPHAGEAL REFLUX DISEASE WITHOUT ESOPHAGITIS: ICD-10-CM

## 2025-08-01 DIAGNOSIS — Z85.118 HISTORY OF LUNG CANCER: ICD-10-CM

## 2025-08-01 DIAGNOSIS — G62.9 NEUROPATHY: ICD-10-CM

## 2025-08-01 DIAGNOSIS — N18.31 STAGE 3A CHRONIC KIDNEY DISEASE (CKD) (MULTI): ICD-10-CM

## 2025-08-01 DIAGNOSIS — Z86.718 HISTORY OF THROMBOEMBOLISM OF VEIN: ICD-10-CM

## 2025-08-01 DIAGNOSIS — N32.81 OVERACTIVE BLADDER: ICD-10-CM

## 2025-08-01 DIAGNOSIS — E03.9 ACQUIRED HYPOTHYROIDISM: ICD-10-CM

## 2025-08-01 DIAGNOSIS — M1A.9XX0 CHRONIC GOUT WITHOUT TOPHUS, UNSPECIFIED CAUSE, UNSPECIFIED SITE: ICD-10-CM

## 2025-08-01 PROCEDURE — 3074F SYST BP LT 130 MM HG: CPT | Performed by: INTERNAL MEDICINE

## 2025-08-01 PROCEDURE — 1126F AMNT PAIN NOTED NONE PRSNT: CPT | Performed by: INTERNAL MEDICINE

## 2025-08-01 PROCEDURE — 1159F MED LIST DOCD IN RCRD: CPT | Performed by: INTERNAL MEDICINE

## 2025-08-01 PROCEDURE — 99214 OFFICE O/P EST MOD 30 MIN: CPT | Performed by: INTERNAL MEDICINE

## 2025-08-01 PROCEDURE — 1160F RVW MEDS BY RX/DR IN RCRD: CPT | Performed by: INTERNAL MEDICINE

## 2025-08-01 PROCEDURE — 3078F DIAST BP <80 MM HG: CPT | Performed by: INTERNAL MEDICINE

## 2025-08-01 PROCEDURE — G2211 COMPLEX E/M VISIT ADD ON: HCPCS | Performed by: INTERNAL MEDICINE

## 2025-08-01 ASSESSMENT — LIFESTYLE VARIABLES
SKIP TO QUESTIONS 9-10: 1
HOW OFTEN DURING THE LAST YEAR HAVE YOU FOUND THAT YOU WERE NOT ABLE TO STOP DRINKING ONCE YOU HAD STARTED: NEVER
AUDIT TOTAL SCORE: 0
HOW OFTEN DO YOU HAVE A DRINK CONTAINING ALCOHOL: NEVER
HOW MANY STANDARD DRINKS CONTAINING ALCOHOL DO YOU HAVE ON A TYPICAL DAY: PATIENT DOES NOT DRINK
HAS A RELATIVE, FRIEND, DOCTOR, OR ANOTHER HEALTH PROFESSIONAL EXPRESSED CONCERN ABOUT YOUR DRINKING OR SUGGESTED YOU CUT DOWN: NO
AUDIT-C TOTAL SCORE: 0
HOW OFTEN DURING THE LAST YEAR HAVE YOU BEEN UNABLE TO REMEMBER WHAT HAPPENED THE NIGHT BEFORE BECAUSE YOU HAD BEEN DRINKING: NEVER
HOW OFTEN DURING THE LAST YEAR HAVE YOU HAD A FEELING OF GUILT OR REMORSE AFTER DRINKING: NEVER
HAVE YOU OR SOMEONE ELSE BEEN INJURED AS A RESULT OF YOUR DRINKING: NO
HOW OFTEN DURING THE LAST YEAR HAVE YOU NEEDED AN ALCOHOLIC DRINK FIRST THING IN THE MORNING TO GET YOURSELF GOING AFTER A NIGHT OF HEAVY DRINKING: NEVER
HOW OFTEN DURING THE LAST YEAR HAVE YOU FAILED TO DO WHAT WAS NORMALLY EXPECTED FROM YOU BECAUSE OF DRINKING: NEVER
HOW OFTEN DO YOU HAVE SIX OR MORE DRINKS ON ONE OCCASION: NEVER

## 2025-08-01 ASSESSMENT — ENCOUNTER SYMPTOMS
LOSS OF SENSATION IN FEET: 0
DEPRESSION: 0
CARDIOVASCULAR NEGATIVE: 1
MUSCULOSKELETAL NEGATIVE: 1
OCCASIONAL FEELINGS OF UNSTEADINESS: 0
GASTROINTESTINAL NEGATIVE: 1
RESPIRATORY NEGATIVE: 1

## 2025-08-01 ASSESSMENT — PAIN SCALES - GENERAL: PAINLEVEL_OUTOF10: 0-NO PAIN

## 2025-08-01 NOTE — PATIENT INSTRUCTIONS
Casey-  It looks like you are doing fine.  I will plan on seeing you back in 3 months.  I will get back to you about the blood work that you are going to do today.

## 2025-08-01 NOTE — PROGRESS NOTES
[facility]: [facility]  PROGRESS NOTE      Joseph Hicks is a 84 y.o. male that is presenting today for Follow-up.    Assessment/Plan   Diagnoses and all orders for this visit:  Essential hypertension  Congestive heart failure, unspecified HF chronicity, unspecified heart failure type  Stage 3a chronic kidney disease (CKD) (Multi)  Overactive bladder  Gastroesophageal reflux disease without esophagitis  Acquired hypothyroidism  Chronic gout without tophus, unspecified cause, unspecified site  Neuropathy  History of thromboembolism of vein  History of lung cancer  Other orders  -     Follow Up In Primary Care - Established  We considered his current situation.  The patient overall is doing well and his and seems to be well compensated for all of the above medical problems.    I ordered blood work that he will get done today and I will get back to him about such.    I will plan on seeing him back in 3 months  Subjective   83 yo here for his 3-month follow-up for his chronic medical problems.  For the most part Casey is felt pretty well over the last 3 months.  He brings forth no real new medical issues today.  He continues to be active.  He works out at least 3 times a week at max strength.      Review of Systems   Respiratory: Negative.     Cardiovascular: Negative.    Gastrointestinal: Negative.    Genitourinary: Negative.    Musculoskeletal: Negative.       Objective   Vitals:    08/01/25 0734   BP: 122/76   Pulse: 54   Temp: 36.3 °C (97.3 °F)   SpO2: 97%      Body mass index is 32.28 kg/m².  Physical Exam    Cardiovascular:      Rate and Rhythm: Normal rate and regular rhythm.      Pulses: Normal pulses.      Heart sounds: Normal heart sounds.   Pulmonary:      Effort: Pulmonary effort is normal.      Breath sounds: Normal breath sounds.   Abdominal:      General: Abdomen is flat.      Palpations: Abdomen is soft.     Musculoskeletal:         General: Normal range of motion.       "Cervical back: Normal range of motion and neck supple.     Skin:     General: Skin is warm and dry.     Neurological:      General: No focal deficit present.       Diagnostic Results   Lab Results   Component Value Date    GLUCOSE 92 2025    CALCIUM 10.1 2025     2025    K 4.9 2025    CO2 28 2025     2025    BUN 17 2025    CREATININE 1.29 2025     Lab Results   Component Value Date    ALT 17 2025    AST 24 2025    ALKPHOS 83 2025    BILITOT 1.5 (H) 2025     Lab Results   Component Value Date    WBC 5.5 2025    HGB 16.7 2025    HCT 50.8 2025     (H) 2025     2025     Lab Results   Component Value Date    CHOL 139 2025    CHOL 235 (H) 10/15/2024    CHOL 165 2024     Lab Results   Component Value Date    HDL 69.1 2025    HDL 59.9 10/15/2024    HDL 73.0 2024     Lab Results   Component Value Date    LDLCALC 47 2025    LDLCALC 149 (H) 10/15/2024    LDLCALC 64 (L) 2024     Lab Results   Component Value Date    TRIG 115 2025    TRIG 131 10/15/2024    TRIG 142 2024     No components found for: \"CHOLHDL\"  Lab Results   Component Value Date    HGBA1C 5.7 2022     Other labs not included in the list above were reviewed either before or during this encounter.    History    Medical History[1]  Surgical History[2]  Family History[3]  Social History     Socioeconomic History    Marital status:      Spouse name: Not on file    Number of children: Not on file    Years of education: Not on file    Highest education level: Not on file   Occupational History    Not on file   Tobacco Use    Smoking status: Former     Current packs/day: 0.00     Types: Cigarettes     Quit date:      Years since quittin.5     Passive exposure: Past    Smokeless tobacco: Never   Vaping Use    Vaping status: Never Used   Substance and Sexual Activity    Alcohol " use: Not Currently    Drug use: Never    Sexual activity: Never   Other Topics Concern    Not on file   Social History Narrative    Not on file     Social Drivers of Health     Financial Resource Strain: Not on file   Food Insecurity: Not on file   Transportation Needs: Not on file   Physical Activity: Not on file   Stress: Not on file   Social Connections: Not on file   Intimate Partner Violence: Not on file   Housing Stability: Not on file     Allergies[4]  Medications Ordered Prior to Encounter[5]  Immunization History   Administered Date(s) Administered    Flu vaccine, quadrivalent, high-dose, preservative free, age 65y+ (FLUZONE) 09/13/2020, 10/01/2020, 09/25/2021, 10/10/2022, 09/26/2023    Flu vaccine, trivalent, preservative free, HIGH-DOSE, age 65y+ (Fluzone) 10/02/2017, 10/02/2018, 10/04/2019    Influenza, injectable, quadrivalent 09/23/2016    Influenza, seasonal, injectable 11/02/2013, 10/01/2014, 10/06/2015    Moderna COVID-19 vaccine, 12 years and older (50mcg/0.5mL)(Spikevax) 10/29/2023    Moderna COVID-19 vaccine, bivalent, blue cap/gray label *Check age/dose* 12/06/2022, 06/16/2023    Moderna SARS-CoV-2 Vaccination 01/15/2021, 02/01/2021, 02/16/2021, 03/02/2021, 11/04/2021, 08/16/2022    Pneumococcal conjugate vaccine, 13-valent (PREVNAR 13) 05/08/2015    Pneumococcal polysaccharide vaccine, 23-valent, age 2 years and older (PNEUMOVAX 23) 05/02/2014, 11/06/2020    RSV, 60 Years And Older (AREXVY) 10/29/2023    Tdap vaccine, age 7 year and older (BOOSTRIX, ADACEL) 10/04/2017    Zoster vaccine, recombinant, adult (SHINGRIX) 01/05/2018, 04/08/2018, 07/20/2018, 05/01/2019    Zoster, live 01/02/2012     Patient's medical history was reviewed and updated either before or during this encounter.       Bogdan Busby MD         [1]   Past Medical History:  Diagnosis Date    Airway clearance impairment 08/26/2023    Angina pectoris 08/26/2023    Arthritis of hip 08/26/2023    Arthritis of right hip  08/26/2023    At risk for bleeding 08/26/2023    Atherosclerotic heart disease of native coronary artery with other forms of angina pectoris 03/18/2019    Benign prostatic hyperplasia without urinary obstruction 03/18/2019    Bilateral sensorineural hearing loss 08/26/2023    Chronic rhinitis 05/06/2024    Congestive heart failure 11/19/2023    Coronary arteriosclerosis 03/18/2019    Depression 08/26/2023    Depressive disorder 03/18/2019    Essential hypertension 08/26/2023    External hemorrhoids 08/26/2023    Gastroesophageal reflux disease 08/26/2023    Gout 08/26/2023    History of lung cancer 10/26/2023    History of malignant neoplasm 05/06/2024    History of thromboembolism of vein 08/26/2023    History of venous thromboembolism 08/26/2023    Hypertension 09/13/2022    Hypothyroidism 08/26/2023    Impaired gas exchange 08/26/2023    Kidney stone 08/26/2023    Lung cancer (Multi) 2012    nonsmall cell    Malignant neoplasm of lung (Multi) 03/18/2019    chemo and radiation, remission for 6 years    Mixed hyperlipidemia 08/26/2023    Neuropathy 08/26/2023    Overactive bladder 08/26/2023    Personal history of malignant neoplasm, unspecified     History of cancer    Primary malignant neoplasm of lung (Multi) 09/20/2012    Pulmonary embolism 2012    Pulmonary embolism 07/2024    Rash and other nonspecific skin eruption 09/13/2022    Sensorineural hearing loss (SNHL) of both ears 08/26/2023    Squamous cell carcinoma lung, 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    Stage 3 chronic kidney disease (Multi) 11/19/2023    Stage 3a chronic kidney disease (CKD) (Multi) 11/19/2023    Wax in ear 05/06/2024   [2]   Past Surgical History:  Procedure Laterality Date    CARDIAC CATHETERIZATION  2013    COLONOSCOPY  2013    CT GUIDED IMAGING FOR NEEDLE PLACEMENT  09/26/2012    CT GUIDED IMAGING FOR NEEDLE PLACEMENT [facility]    OTHER SURGICAL HISTORY  08/24/2022    Hip replacement    TOTAL HIP ARTHROPLASTY Left    [3]   Family  History  Problem Relation Name Age of Onset    Hypothyroidism Daughter     [4] No Known Allergies  [5]   Current Outpatient Medications on File Prior to Visit   Medication Sig Dispense Refill    levothyroxine (Synthroid, Levoxyl) 50 mcg tablet Take 1 tablet (50 mcg) by mouth once daily. 90 tablet 3    multivitamin with minerals (multivitamin with folic acid) tablet Take 1 tablet by mouth once daily.  Additional Instructions: SUPPLEMENT      omeprazole (PriLOSEC) 40 mg DR capsule Take 1 capsule (40 mg) by mouth early in the morning.. Do not crush or chew.      oxybutynin XL (Ditropan-XL) 10 mg 24 hr tablet Take 1 tablet (10 mg) by mouth once daily. 90 tablet 3    rivaroxaban (Xarelto) 20 mg tablet Take 1 tablet (20 mg) by mouth once daily. Take with food.      rosuvastatin (Crestor) 20 mg tablet Take 1 tablet (20 mg) by mouth once daily. 90 tablet 3    torsemide (Demadex) 5 mg tablet Take 1 tablet (5 mg) by mouth once daily.      valsartan (Diovan) 80 mg tablet Take 1 tablet (80 mg) by mouth once daily. 90 tablet 3     No current facility-administered medications on file prior to visit.

## 2025-08-02 LAB
ALBUMIN SERPL-MCNC: 4.1 G/DL (ref 3.6–5.1)
ALP SERPL-CCNC: 76 U/L (ref 35–144)
ALT SERPL-CCNC: 16 U/L (ref 9–46)
ANION GAP SERPL CALCULATED.4IONS-SCNC: 9 MMOL/L (CALC) (ref 7–17)
AST SERPL-CCNC: 19 U/L (ref 10–35)
BASOPHILS # BLD AUTO: 48 CELLS/UL (ref 0–200)
BASOPHILS NFR BLD AUTO: 1.1 %
BILIRUB SERPL-MCNC: 0.9 MG/DL (ref 0.2–1.2)
BUN SERPL-MCNC: 21 MG/DL (ref 7–25)
CALCIUM SERPL-MCNC: 8.9 MG/DL (ref 8.6–10.3)
CHLORIDE SERPL-SCNC: 106 MMOL/L (ref 98–110)
CHOLEST SERPL-MCNC: 124 MG/DL
CHOLEST/HDLC SERPL: 2 (CALC)
CO2 SERPL-SCNC: 25 MMOL/L (ref 20–32)
CREAT SERPL-MCNC: 1.25 MG/DL (ref 0.7–1.22)
EGFRCR SERPLBLD CKD-EPI 2021: 56 ML/MIN/1.73M2
EOSINOPHIL # BLD AUTO: 202 CELLS/UL (ref 15–500)
EOSINOPHIL NFR BLD AUTO: 4.6 %
ERYTHROCYTE [DISTWIDTH] IN BLOOD BY AUTOMATED COUNT: 13.2 % (ref 11–15)
GLUCOSE SERPL-MCNC: 100 MG/DL (ref 65–99)
HCT VFR BLD AUTO: 45.6 % (ref 38.5–50)
HDLC SERPL-MCNC: 61 MG/DL
HGB BLD-MCNC: 14.9 G/DL (ref 13.2–17.1)
LDLC SERPL CALC-MCNC: 49 MG/DL (CALC)
LYMPHOCYTES # BLD AUTO: 986 CELLS/UL (ref 850–3900)
LYMPHOCYTES NFR BLD AUTO: 22.4 %
MCH RBC QN AUTO: 32.7 PG (ref 27–33)
MCHC RBC AUTO-ENTMCNC: 32.7 G/DL (ref 32–36)
MCV RBC AUTO: 100 FL (ref 80–100)
MONOCYTES # BLD AUTO: 361 CELLS/UL (ref 200–950)
MONOCYTES NFR BLD AUTO: 8.2 %
NEUTROPHILS # BLD AUTO: 2803 CELLS/UL (ref 1500–7800)
NEUTROPHILS NFR BLD AUTO: 63.7 %
NONHDLC SERPL-MCNC: 63 MG/DL (CALC)
PLATELET # BLD AUTO: 178 THOUSAND/UL (ref 140–400)
PMV BLD REES-ECKER: 9.5 FL (ref 7.5–12.5)
POTASSIUM SERPL-SCNC: 4.3 MMOL/L (ref 3.5–5.3)
PROT SERPL-MCNC: 6.3 G/DL (ref 6.1–8.1)
RBC # BLD AUTO: 4.56 MILLION/UL (ref 4.2–5.8)
SODIUM SERPL-SCNC: 140 MMOL/L (ref 135–146)
TRIGL SERPL-MCNC: 68 MG/DL
TSH SERPL-ACNC: 4 MIU/L (ref 0.4–4.5)
URATE SERPL-MCNC: 6.7 MG/DL (ref 4–8)
WBC # BLD AUTO: 4.4 THOUSAND/UL (ref 3.8–10.8)

## 2025-08-06 ENCOUNTER — APPOINTMENT (OUTPATIENT)
Dept: OTOLARYNGOLOGY | Facility: CLINIC | Age: 85
End: 2025-08-06
Payer: MEDICARE

## 2025-08-07 ENCOUNTER — TELEPHONE (OUTPATIENT)
Dept: AUDIOLOGY | Facility: CLINIC | Age: 85
End: 2025-08-07
Payer: MEDICARE

## 2025-08-07 NOTE — TELEPHONE ENCOUNTER
Scheduled follow up; Mr. Hicks will bring [employer] hearing aids and hearing aid  to appointment.   His hearing aids are still disconnecting during phone calls constantly. He will be provided with [employer] hearing aids and his hearing aids will be sent out for  repair.

## 2025-09-03 ENCOUNTER — NURSE TRIAGE (OUTPATIENT)
Dept: AUDIOLOGY | Facility: CLINIC | Age: 85
End: 2025-09-03
Payer: MEDICARE

## 2025-10-10 ENCOUNTER — APPOINTMENT (OUTPATIENT)
Dept: AUDIOLOGY | Facility: CLINIC | Age: 85
End: 2025-10-10

## 2025-11-12 ENCOUNTER — APPOINTMENT (OUTPATIENT)
Dept: OTOLARYNGOLOGY | Facility: CLINIC | Age: 85
End: 2025-11-12
Payer: MEDICARE